# Patient Record
Sex: FEMALE | Race: WHITE | NOT HISPANIC OR LATINO | Employment: OTHER | URBAN - METROPOLITAN AREA
[De-identification: names, ages, dates, MRNs, and addresses within clinical notes are randomized per-mention and may not be internally consistent; named-entity substitution may affect disease eponyms.]

---

## 2023-04-10 ENCOUNTER — HOSPITAL ENCOUNTER (OUTPATIENT)
Facility: HOSPITAL | Age: 63
LOS: 1 days | Discharge: HOME | End: 2023-04-11
Attending: EMERGENCY MEDICINE | Admitting: INTERNAL MEDICINE
Payer: MEDICARE

## 2023-04-10 ENCOUNTER — APPOINTMENT (OUTPATIENT)
Dept: GENERAL RADIOLOGY | Facility: HOSPITAL | Age: 63
End: 2023-04-10
Payer: MEDICARE

## 2023-04-10 ENCOUNTER — APPOINTMENT (OUTPATIENT)
Dept: CT IMAGING | Facility: HOSPITAL | Age: 63
End: 2023-04-10
Payer: MEDICARE

## 2023-04-10 DIAGNOSIS — N39.0 RECURRENT UTI: ICD-10-CM

## 2023-04-10 DIAGNOSIS — N39.0 ACUTE UTI: Primary | ICD-10-CM

## 2023-04-10 DIAGNOSIS — G89.4 CHRONIC PAIN SYNDROME: ICD-10-CM

## 2023-04-10 DIAGNOSIS — C76.0 HEAD AND NECK CANCER: ICD-10-CM

## 2023-04-10 DIAGNOSIS — R41.0 CONFUSION: ICD-10-CM

## 2023-04-10 PROBLEM — A41.9 SEPSIS SECONDARY TO UTI: Status: ACTIVE | Noted: 2023-04-10

## 2023-04-10 PROBLEM — G89.29 CHRONIC PAIN: Status: ACTIVE | Noted: 2023-04-10

## 2023-04-10 PROBLEM — R41.82 ALTERED MENTAL STATUS: Status: ACTIVE | Noted: 2023-04-10

## 2023-04-10 LAB
ALBUMIN SERPL-MCNC: 4 G/DL (ref 3.5–5.2)
ALBUMIN/GLOB SERPL: 1.2 G/DL
ALP SERPL-CCNC: 118 U/L (ref 39–117)
ALT SERPL W P-5'-P-CCNC: 8 U/L (ref 1–33)
AMMONIA BLD-SCNC: 16 UMOL/L (ref 11–51)
AMPHET+METHAMPHET UR QL: NEGATIVE
AMPHETAMINES UR QL: NEGATIVE
ANION GAP SERPL CALCULATED.3IONS-SCNC: 8 MMOL/L (ref 5–15)
APAP SERPL-MCNC: <5 MCG/ML (ref 0–30)
AST SERPL-CCNC: 19 U/L (ref 1–32)
BACTERIA UR QL AUTO: ABNORMAL /HPF
BARBITURATES UR QL SCN: NEGATIVE
BASOPHILS # BLD AUTO: 0.05 10*3/MM3 (ref 0–0.2)
BASOPHILS NFR BLD AUTO: 0.5 % (ref 0–1.5)
BENZODIAZ UR QL SCN: NEGATIVE
BILIRUB SERPL-MCNC: 0.6 MG/DL (ref 0–1.2)
BILIRUB UR QL STRIP: NEGATIVE
BUN SERPL-MCNC: 5 MG/DL (ref 8–23)
BUN/CREAT SERPL: 10.2 (ref 7–25)
BUPRENORPHINE SERPL-MCNC: POSITIVE NG/ML
CALCIUM SPEC-SCNC: 9 MG/DL (ref 8.6–10.5)
CANNABINOIDS SERPL QL: NEGATIVE
CHLORIDE SERPL-SCNC: 98 MMOL/L (ref 98–107)
CLARITY UR: ABNORMAL
CO2 SERPL-SCNC: 29 MMOL/L (ref 22–29)
COCAINE UR QL: NEGATIVE
COLOR UR: YELLOW
CREAT SERPL-MCNC: 0.49 MG/DL (ref 0.57–1)
D-LACTATE SERPL-SCNC: 0.8 MMOL/L (ref 0.5–2)
DEPRECATED RDW RBC AUTO: 44.5 FL (ref 37–54)
EGFRCR SERPLBLD CKD-EPI 2021: 107.4 ML/MIN/1.73
EOSINOPHIL # BLD AUTO: 0.05 10*3/MM3 (ref 0–0.4)
EOSINOPHIL NFR BLD AUTO: 0.5 % (ref 0.3–6.2)
ERYTHROCYTE [DISTWIDTH] IN BLOOD BY AUTOMATED COUNT: 12.8 % (ref 12.3–15.4)
ETHANOL BLD-MCNC: <10 MG/DL (ref 0–10)
GLOBULIN UR ELPH-MCNC: 3.3 GM/DL
GLUCOSE SERPL-MCNC: 105 MG/DL (ref 65–99)
GLUCOSE UR STRIP-MCNC: NEGATIVE MG/DL
HCT VFR BLD AUTO: 42.9 % (ref 34–46.6)
HGB BLD-MCNC: 13.6 G/DL (ref 12–15.9)
HGB UR QL STRIP.AUTO: ABNORMAL
HOLD SPECIMEN: NORMAL
HYALINE CASTS UR QL AUTO: ABNORMAL /LPF
IMM GRANULOCYTES # BLD AUTO: 0.04 10*3/MM3 (ref 0–0.05)
IMM GRANULOCYTES NFR BLD AUTO: 0.4 % (ref 0–0.5)
KETONES UR QL STRIP: NEGATIVE
LEUKOCYTE ESTERASE UR QL STRIP.AUTO: ABNORMAL
LYMPHOCYTES # BLD AUTO: 1.46 10*3/MM3 (ref 0.7–3.1)
LYMPHOCYTES NFR BLD AUTO: 13.5 % (ref 19.6–45.3)
MAGNESIUM SERPL-MCNC: 2 MG/DL (ref 1.6–2.4)
MCH RBC QN AUTO: 29.8 PG (ref 26.6–33)
MCHC RBC AUTO-ENTMCNC: 31.7 G/DL (ref 31.5–35.7)
MCV RBC AUTO: 93.9 FL (ref 79–97)
METHADONE UR QL SCN: NEGATIVE
MONOCYTES # BLD AUTO: 0.91 10*3/MM3 (ref 0.1–0.9)
MONOCYTES NFR BLD AUTO: 8.4 % (ref 5–12)
NEUTROPHILS NFR BLD AUTO: 76.7 % (ref 42.7–76)
NEUTROPHILS NFR BLD AUTO: 8.3 10*3/MM3 (ref 1.7–7)
NITRITE UR QL STRIP: POSITIVE
NRBC BLD AUTO-RTO: 0 /100 WBC (ref 0–0.2)
OPIATES UR QL: NEGATIVE
OXYCODONE UR QL SCN: NEGATIVE
PCP UR QL SCN: NEGATIVE
PH UR STRIP.AUTO: 5.5 [PH] (ref 5–8)
PLATELET # BLD AUTO: 250 10*3/MM3 (ref 140–450)
PMV BLD AUTO: 9 FL (ref 6–12)
POTASSIUM SERPL-SCNC: 3.9 MMOL/L (ref 3.5–5.2)
PROCALCITONIN SERPL-MCNC: 0.05 NG/ML (ref 0–0.25)
PROPOXYPH UR QL: NEGATIVE
PROT SERPL-MCNC: 7.3 G/DL (ref 6–8.5)
PROT UR QL STRIP: ABNORMAL
RBC # BLD AUTO: 4.57 10*6/MM3 (ref 3.77–5.28)
RBC # UR STRIP: ABNORMAL /HPF
REF LAB TEST METHOD: ABNORMAL
SALICYLATES SERPL-MCNC: <0.3 MG/DL
SODIUM SERPL-SCNC: 135 MMOL/L (ref 136–145)
SP GR UR STRIP: 1.01 (ref 1–1.03)
SQUAMOUS #/AREA URNS HPF: ABNORMAL /HPF
TRICYCLICS UR QL SCN: POSITIVE
TROPONIN T SERPL HS-MCNC: 9 NG/L
TSH SERPL DL<=0.05 MIU/L-ACNC: 0.83 UIU/ML (ref 0.27–4.2)
UROBILINOGEN UR QL STRIP: ABNORMAL
WBC # UR STRIP: ABNORMAL /HPF
WBC NRBC COR # BLD: 10.81 10*3/MM3 (ref 3.4–10.8)
WHOLE BLOOD HOLD COAG: NORMAL
WHOLE BLOOD HOLD SPECIMEN: NORMAL

## 2023-04-10 PROCEDURE — 85025 COMPLETE CBC W/AUTO DIFF WBC: CPT

## 2023-04-10 PROCEDURE — 36415 COLL VENOUS BLD VENIPUNCTURE: CPT

## 2023-04-10 PROCEDURE — 80179 DRUG ASSAY SALICYLATE: CPT | Performed by: EMERGENCY MEDICINE

## 2023-04-10 PROCEDURE — 81001 URINALYSIS AUTO W/SCOPE: CPT | Performed by: EMERGENCY MEDICINE

## 2023-04-10 PROCEDURE — 80306 DRUG TEST PRSMV INSTRMNT: CPT | Performed by: EMERGENCY MEDICINE

## 2023-04-10 PROCEDURE — 25010000002 CEFTRIAXONE PER 250 MG: Performed by: STUDENT IN AN ORGANIZED HEALTH CARE EDUCATION/TRAINING PROGRAM

## 2023-04-10 PROCEDURE — 87086 URINE CULTURE/COLONY COUNT: CPT | Performed by: EMERGENCY MEDICINE

## 2023-04-10 PROCEDURE — 84484 ASSAY OF TROPONIN QUANT: CPT

## 2023-04-10 PROCEDURE — 25010000002 ENOXAPARIN PER 10 MG: Performed by: STUDENT IN AN ORGANIZED HEALTH CARE EDUCATION/TRAINING PROGRAM

## 2023-04-10 PROCEDURE — 99284 EMERGENCY DEPT VISIT MOD MDM: CPT

## 2023-04-10 PROCEDURE — 82077 ASSAY SPEC XCP UR&BREATH IA: CPT | Performed by: EMERGENCY MEDICINE

## 2023-04-10 PROCEDURE — 71045 X-RAY EXAM CHEST 1 VIEW: CPT

## 2023-04-10 PROCEDURE — 87077 CULTURE AEROBIC IDENTIFY: CPT | Performed by: EMERGENCY MEDICINE

## 2023-04-10 PROCEDURE — 84145 PROCALCITONIN (PCT): CPT | Performed by: EMERGENCY MEDICINE

## 2023-04-10 PROCEDURE — 82140 ASSAY OF AMMONIA: CPT | Performed by: EMERGENCY MEDICINE

## 2023-04-10 PROCEDURE — 87186 SC STD MICRODIL/AGAR DIL: CPT | Performed by: EMERGENCY MEDICINE

## 2023-04-10 PROCEDURE — 83605 ASSAY OF LACTIC ACID: CPT | Performed by: EMERGENCY MEDICINE

## 2023-04-10 PROCEDURE — 83735 ASSAY OF MAGNESIUM: CPT

## 2023-04-10 PROCEDURE — 70450 CT HEAD/BRAIN W/O DYE: CPT

## 2023-04-10 PROCEDURE — 80053 COMPREHEN METABOLIC PANEL: CPT

## 2023-04-10 PROCEDURE — 87040 BLOOD CULTURE FOR BACTERIA: CPT | Performed by: STUDENT IN AN ORGANIZED HEALTH CARE EDUCATION/TRAINING PROGRAM

## 2023-04-10 PROCEDURE — 84443 ASSAY THYROID STIM HORMONE: CPT | Performed by: STUDENT IN AN ORGANIZED HEALTH CARE EDUCATION/TRAINING PROGRAM

## 2023-04-10 PROCEDURE — 80143 DRUG ASSAY ACETAMINOPHEN: CPT | Performed by: EMERGENCY MEDICINE

## 2023-04-10 PROCEDURE — 93005 ELECTROCARDIOGRAM TRACING: CPT

## 2023-04-10 PROCEDURE — 96372 THER/PROPH/DIAG INJ SC/IM: CPT

## 2023-04-10 RX ORDER — AMOXICILLIN 250 MG
2 CAPSULE ORAL 2 TIMES DAILY
Status: DISCONTINUED | OUTPATIENT
Start: 2023-04-10 | End: 2023-04-11 | Stop reason: HOSPADM

## 2023-04-10 RX ORDER — SODIUM CHLORIDE 9 MG/ML
40 INJECTION, SOLUTION INTRAVENOUS AS NEEDED
Status: DISCONTINUED | OUTPATIENT
Start: 2023-04-10 | End: 2023-04-11 | Stop reason: HOSPADM

## 2023-04-10 RX ORDER — CHOLECALCIFEROL (VITAMIN D3) 125 MCG
5 CAPSULE ORAL NIGHTLY PRN
Status: DISCONTINUED | OUTPATIENT
Start: 2023-04-10 | End: 2023-04-11 | Stop reason: HOSPADM

## 2023-04-10 RX ORDER — CYCLOBENZAPRINE HCL 10 MG
10 TABLET ORAL 2 TIMES DAILY PRN
Status: DISCONTINUED | OUTPATIENT
Start: 2023-04-10 | End: 2023-04-11 | Stop reason: HOSPADM

## 2023-04-10 RX ORDER — GABAPENTIN 800 MG/1
800 TABLET ORAL 3 TIMES DAILY
COMMUNITY

## 2023-04-10 RX ORDER — SODIUM CHLORIDE, SODIUM LACTATE, POTASSIUM CHLORIDE, CALCIUM CHLORIDE 600; 310; 30; 20 MG/100ML; MG/100ML; MG/100ML; MG/100ML
75 INJECTION, SOLUTION INTRAVENOUS CONTINUOUS
Status: ACTIVE | OUTPATIENT
Start: 2023-04-10 | End: 2023-04-11

## 2023-04-10 RX ORDER — ACETAMINOPHEN 325 MG/1
650 TABLET ORAL EVERY 4 HOURS PRN
Status: DISCONTINUED | OUTPATIENT
Start: 2023-04-10 | End: 2023-04-11 | Stop reason: HOSPADM

## 2023-04-10 RX ORDER — CYCLOBENZAPRINE HCL 10 MG
10 TABLET ORAL 2 TIMES DAILY PRN
COMMUNITY

## 2023-04-10 RX ORDER — BUPRENORPHINE AND NALOXONE 2; .5 MG/1; MG/1
0.25 FILM, SOLUBLE BUCCAL; SUBLINGUAL DAILY
COMMUNITY
End: 2023-04-19

## 2023-04-10 RX ORDER — GUAIFENESIN 600 MG/1
1200 TABLET, EXTENDED RELEASE ORAL 2 TIMES DAILY
COMMUNITY
End: 2023-04-19

## 2023-04-10 RX ORDER — BISACODYL 10 MG
10 SUPPOSITORY, RECTAL RECTAL DAILY PRN
Status: DISCONTINUED | OUTPATIENT
Start: 2023-04-10 | End: 2023-04-11 | Stop reason: HOSPADM

## 2023-04-10 RX ORDER — BUPRENORPHINE HYDROCHLORIDE AND NALOXONE HYDROCHLORIDE DIHYDRATE 8; 2 MG/1; MG/1
1 TABLET SUBLINGUAL DAILY
Status: ON HOLD | COMMUNITY
End: 2023-04-10

## 2023-04-10 RX ORDER — CYCLOBENZAPRINE HCL 10 MG
10 TABLET ORAL 3 TIMES DAILY PRN
Status: DISCONTINUED | OUTPATIENT
Start: 2023-04-10 | End: 2023-04-10

## 2023-04-10 RX ORDER — SODIUM CHLORIDE 0.9 % (FLUSH) 0.9 %
10 SYRINGE (ML) INJECTION EVERY 12 HOURS SCHEDULED
Status: DISCONTINUED | OUTPATIENT
Start: 2023-04-10 | End: 2023-04-11 | Stop reason: HOSPADM

## 2023-04-10 RX ORDER — BUPRENORPHINE HYDROCHLORIDE AND NALOXONE HYDROCHLORIDE DIHYDRATE 2; .5 MG/1; MG/1
1 TABLET SUBLINGUAL 4 TIMES DAILY PRN
Status: DISCONTINUED | OUTPATIENT
Start: 2023-04-10 | End: 2023-04-11 | Stop reason: HOSPADM

## 2023-04-10 RX ORDER — SODIUM CHLORIDE 0.9 % (FLUSH) 0.9 %
10 SYRINGE (ML) INJECTION AS NEEDED
Status: DISCONTINUED | OUTPATIENT
Start: 2023-04-10 | End: 2023-04-11 | Stop reason: HOSPADM

## 2023-04-10 RX ORDER — TRAMADOL HYDROCHLORIDE 50 MG/1
50 TABLET ORAL 2 TIMES DAILY
Status: DISCONTINUED | OUTPATIENT
Start: 2023-04-10 | End: 2023-04-10

## 2023-04-10 RX ORDER — ENOXAPARIN SODIUM 100 MG/ML
40 INJECTION SUBCUTANEOUS NIGHTLY
Status: DISCONTINUED | OUTPATIENT
Start: 2023-04-10 | End: 2023-04-11 | Stop reason: HOSPADM

## 2023-04-10 RX ORDER — POLYETHYLENE GLYCOL 3350 17 G/17G
17 POWDER, FOR SOLUTION ORAL DAILY PRN
Status: DISCONTINUED | OUTPATIENT
Start: 2023-04-10 | End: 2023-04-11 | Stop reason: HOSPADM

## 2023-04-10 RX ORDER — ONDANSETRON 2 MG/ML
4 INJECTION INTRAMUSCULAR; INTRAVENOUS EVERY 6 HOURS PRN
Status: DISCONTINUED | OUTPATIENT
Start: 2023-04-10 | End: 2023-04-11 | Stop reason: HOSPADM

## 2023-04-10 RX ORDER — GABAPENTIN 400 MG/1
800 CAPSULE ORAL EVERY 8 HOURS SCHEDULED
Status: DISCONTINUED | OUTPATIENT
Start: 2023-04-10 | End: 2023-04-11 | Stop reason: HOSPADM

## 2023-04-10 RX ORDER — TRAMADOL HYDROCHLORIDE 50 MG/1
50 TABLET ORAL EVERY 12 HOURS PRN
Status: DISCONTINUED | OUTPATIENT
Start: 2023-04-10 | End: 2023-04-11 | Stop reason: HOSPADM

## 2023-04-10 RX ORDER — BISACODYL 5 MG/1
5 TABLET, DELAYED RELEASE ORAL DAILY PRN
Status: DISCONTINUED | OUTPATIENT
Start: 2023-04-10 | End: 2023-04-11 | Stop reason: HOSPADM

## 2023-04-10 RX ORDER — TRAMADOL HYDROCHLORIDE 50 MG/1
50 TABLET ORAL 2 TIMES DAILY
COMMUNITY

## 2023-04-10 RX ORDER — SODIUM CHLORIDE 0.9 % (FLUSH) 0.9 %
10 SYRINGE (ML) INJECTION AS NEEDED
Status: DISCONTINUED | OUTPATIENT
Start: 2023-04-10 | End: 2023-04-10 | Stop reason: SDUPTHER

## 2023-04-10 RX ADMIN — SODIUM CHLORIDE 1 G: 900 INJECTION INTRAVENOUS at 22:21

## 2023-04-10 RX ADMIN — Medication 10 ML: at 22:48

## 2023-04-10 RX ADMIN — SODIUM CHLORIDE, POTASSIUM CHLORIDE, SODIUM LACTATE AND CALCIUM CHLORIDE 75 ML/HR: 600; 310; 30; 20 INJECTION, SOLUTION INTRAVENOUS at 23:32

## 2023-04-10 RX ADMIN — ENOXAPARIN SODIUM 40 MG: 40 INJECTION SUBCUTANEOUS at 22:20

## 2023-04-10 RX ADMIN — SENNOSIDES AND DOCUSATE SODIUM 2 TABLET: 50; 8.6 TABLET ORAL at 22:20

## 2023-04-10 RX ADMIN — GABAPENTIN 800 MG: 400 CAPSULE ORAL at 22:40

## 2023-04-10 RX ADMIN — Medication: at 23:36

## 2023-04-10 RX ADMIN — ACETAMINOPHEN 325MG 650 MG: 325 TABLET ORAL at 23:32

## 2023-04-10 RX ADMIN — BUPRENORPHINE AND NALOXONE 1 TABLET: 2; .5 TABLET SUBLINGUAL at 23:54

## 2023-04-10 NOTE — Clinical Note
Level of Care: Telemetry [5]   Diagnosis: Sepsis secondary to UTI [519862]   Admitting Physician: ANDREINA ALEX [[id number]]

## 2023-04-11 ENCOUNTER — READMISSION MANAGEMENT (OUTPATIENT)
Dept: CALL CENTER | Facility: HOSPITAL | Age: 63
End: 2023-04-11
Payer: MEDICARE

## 2023-04-11 VITALS
HEART RATE: 87 BPM | SYSTOLIC BLOOD PRESSURE: 102 MMHG | TEMPERATURE: 98.1 F | WEIGHT: 122 LBS | DIASTOLIC BLOOD PRESSURE: 62 MMHG | RESPIRATION RATE: 18 BRPM | BODY MASS INDEX: 20.83 KG/M2 | HEIGHT: 64 IN | OXYGEN SATURATION: 95 %

## 2023-04-11 PROBLEM — R41.82 ALTERED MENTAL STATUS: Status: RESOLVED | Noted: 2023-04-10 | Resolved: 2023-04-11

## 2023-04-11 PROBLEM — N39.0 SEPSIS SECONDARY TO UTI: Status: RESOLVED | Noted: 2023-04-10 | Resolved: 2023-04-11

## 2023-04-11 PROBLEM — N39.0 ACUTE UTI: Status: RESOLVED | Noted: 2023-04-11 | Resolved: 2023-04-11

## 2023-04-11 PROBLEM — N39.0 ACUTE UTI: Status: ACTIVE | Noted: 2023-04-11

## 2023-04-11 PROBLEM — A41.9 SEPSIS SECONDARY TO UTI: Status: RESOLVED | Noted: 2023-04-10 | Resolved: 2023-04-11

## 2023-04-11 LAB
ANION GAP SERPL CALCULATED.3IONS-SCNC: 6 MMOL/L (ref 5–15)
BASOPHILS # BLD AUTO: 0.05 10*3/MM3 (ref 0–0.2)
BASOPHILS NFR BLD AUTO: 0.5 % (ref 0–1.5)
BUN SERPL-MCNC: 6 MG/DL (ref 8–23)
BUN/CREAT SERPL: 12 (ref 7–25)
CALCIUM SPEC-SCNC: 8.8 MG/DL (ref 8.6–10.5)
CHLORIDE SERPL-SCNC: 98 MMOL/L (ref 98–107)
CO2 SERPL-SCNC: 28 MMOL/L (ref 22–29)
CREAT SERPL-MCNC: 0.5 MG/DL (ref 0.57–1)
DEPRECATED RDW RBC AUTO: 43.9 FL (ref 37–54)
EGFRCR SERPLBLD CKD-EPI 2021: 106.9 ML/MIN/1.73
EOSINOPHIL # BLD AUTO: 0.09 10*3/MM3 (ref 0–0.4)
EOSINOPHIL NFR BLD AUTO: 0.9 % (ref 0.3–6.2)
ERYTHROCYTE [DISTWIDTH] IN BLOOD BY AUTOMATED COUNT: 12.7 % (ref 12.3–15.4)
GLUCOSE BLDC GLUCOMTR-MCNC: 107 MG/DL (ref 70–130)
GLUCOSE BLDC GLUCOMTR-MCNC: 125 MG/DL (ref 70–130)
GLUCOSE SERPL-MCNC: 114 MG/DL (ref 65–99)
HCT VFR BLD AUTO: 35.1 % (ref 34–46.6)
HGB BLD-MCNC: 11.5 G/DL (ref 12–15.9)
IMM GRANULOCYTES # BLD AUTO: 0.04 10*3/MM3 (ref 0–0.05)
IMM GRANULOCYTES NFR BLD AUTO: 0.4 % (ref 0–0.5)
LYMPHOCYTES # BLD AUTO: 1.6 10*3/MM3 (ref 0.7–3.1)
LYMPHOCYTES NFR BLD AUTO: 15.7 % (ref 19.6–45.3)
MCH RBC QN AUTO: 30.6 PG (ref 26.6–33)
MCHC RBC AUTO-ENTMCNC: 32.8 G/DL (ref 31.5–35.7)
MCV RBC AUTO: 93.4 FL (ref 79–97)
MONOCYTES # BLD AUTO: 1.09 10*3/MM3 (ref 0.1–0.9)
MONOCYTES NFR BLD AUTO: 10.7 % (ref 5–12)
NEUTROPHILS NFR BLD AUTO: 7.34 10*3/MM3 (ref 1.7–7)
NEUTROPHILS NFR BLD AUTO: 71.8 % (ref 42.7–76)
NRBC BLD AUTO-RTO: 0 /100 WBC (ref 0–0.2)
PLATELET # BLD AUTO: 216 10*3/MM3 (ref 140–450)
PMV BLD AUTO: 9.1 FL (ref 6–12)
POTASSIUM SERPL-SCNC: 3.6 MMOL/L (ref 3.5–5.2)
RBC # BLD AUTO: 3.76 10*6/MM3 (ref 3.77–5.28)
SODIUM SERPL-SCNC: 132 MMOL/L (ref 136–145)
WBC NRBC COR # BLD: 10.21 10*3/MM3 (ref 3.4–10.8)

## 2023-04-11 PROCEDURE — G0378 HOSPITAL OBSERVATION PER HR: HCPCS

## 2023-04-11 PROCEDURE — 80048 BASIC METABOLIC PNL TOTAL CA: CPT | Performed by: STUDENT IN AN ORGANIZED HEALTH CARE EDUCATION/TRAINING PROGRAM

## 2023-04-11 PROCEDURE — 63710000001 POTASSIUM CHLORIDE 10 MEQ CAPSULE CONTROLLED-RELEASE: Performed by: INTERNAL MEDICINE

## 2023-04-11 PROCEDURE — A9270 NON-COVERED ITEM OR SERVICE: HCPCS | Performed by: STUDENT IN AN ORGANIZED HEALTH CARE EDUCATION/TRAINING PROGRAM

## 2023-04-11 PROCEDURE — 63710000001 SENNOSIDES-DOCUSATE 8.6-50 MG TABLET: Performed by: STUDENT IN AN ORGANIZED HEALTH CARE EDUCATION/TRAINING PROGRAM

## 2023-04-11 PROCEDURE — 92610 EVALUATE SWALLOWING FUNCTION: CPT

## 2023-04-11 PROCEDURE — A9270 NON-COVERED ITEM OR SERVICE: HCPCS | Performed by: INTERNAL MEDICINE

## 2023-04-11 PROCEDURE — 63710000001: Performed by: STUDENT IN AN ORGANIZED HEALTH CARE EDUCATION/TRAINING PROGRAM

## 2023-04-11 PROCEDURE — 85025 COMPLETE CBC W/AUTO DIFF WBC: CPT | Performed by: STUDENT IN AN ORGANIZED HEALTH CARE EDUCATION/TRAINING PROGRAM

## 2023-04-11 PROCEDURE — 82962 GLUCOSE BLOOD TEST: CPT

## 2023-04-11 RX ORDER — POTASSIUM CHLORIDE 750 MG/1
40 CAPSULE, EXTENDED RELEASE ORAL EVERY 4 HOURS
Status: DISCONTINUED | OUTPATIENT
Start: 2023-04-11 | End: 2023-04-11 | Stop reason: HOSPADM

## 2023-04-11 RX ORDER — CEFUROXIME AXETIL 250 MG/1
250 TABLET ORAL 2 TIMES DAILY
Qty: 6 TABLET | Refills: 0 | Status: SHIPPED | OUTPATIENT
Start: 2023-04-11 | End: 2023-04-14

## 2023-04-11 RX ADMIN — GABAPENTIN 800 MG: 400 CAPSULE ORAL at 06:15

## 2023-04-11 RX ADMIN — POTASSIUM CHLORIDE 40 MEQ: 10 CAPSULE, COATED, EXTENDED RELEASE ORAL at 08:57

## 2023-04-11 RX ADMIN — SENNOSIDES AND DOCUSATE SODIUM 2 TABLET: 50; 8.6 TABLET ORAL at 08:57

## 2023-04-11 RX ADMIN — BUPRENORPHINE AND NALOXONE 1 TABLET: 2; .5 TABLET SUBLINGUAL at 09:05

## 2023-04-11 RX ADMIN — Medication 10 ML: at 08:58

## 2023-04-11 NOTE — ED PROVIDER NOTES
"Subjective   History of Present Illness  63-year-old female presents for evaluation of altered mental status.  Of note, the patient is accompanied by her sister as she is a somewhat poor historian.  She was reportedly recently admitted to the hospital in [place] for sepsis secondary to UTI and pneumonia.  She just recently relocated here to [place] and is living with her sister.  Her sister notes that she has had increased confusion when compared to baseline over the past few days and seems to be \"talking out of her head.\"  Her sister was concerned about a recurrent UTI and brought her to the ED to be evaluated.  No known head trauma.  No known fevers.  No known exposures to anyone with COVID-19.  The patient denies any illicit drug use.  No known falls.        Review of Systems   Unable to perform ROS: Mental status change   Psychiatric/Behavioral: Positive for confusion.       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        Allergies   Allergen Reactions   • Sulfa Antibiotics Hives       Past Surgical History:   Procedure Laterality Date   • FACIAL PROCEDURE     • HYSTERECTOMY     • KNEE SURGERY Bilateral        Family History   Problem Relation Age of Onset   • Coronary artery disease Mother    • Alzheimer's disease Father        Social History     Socioeconomic History   • Marital status: Single   Tobacco Use   • Smoking status: Some Days     Packs/day: 0.50     Years: 40.00     Pack years: 20.00     Types: Cigarettes   • Smokeless tobacco: Never   Vaping Use   • Vaping Use: Some days   • Substances: Nicotine   • Devices: Disposable   • Passive vaping exposure: Yes   Substance and Sexual Activity   • Alcohol use: Not Currently   • Drug use: Never   • Sexual activity: Defer "           Objective   Physical Exam  Vitals and nursing note reviewed.   Constitutional:       Appearance: She is well-developed. She is not diaphoretic.      Comments: Nontoxic-appearing female, appears mildly confused   HENT:      Head: Normocephalic and atraumatic.   Eyes:      Pupils: Pupils are equal, round, and reactive to light.   Neck:      Comments: No meningeal signs or nuchal rigidity  Cardiovascular:      Rate and Rhythm: Normal rate and regular rhythm.      Heart sounds: Normal heart sounds. No murmur heard.    No friction rub. No gallop.   Pulmonary:      Effort: Pulmonary effort is normal. No respiratory distress.      Breath sounds: Normal breath sounds. No wheezing or rales.   Abdominal:      General: Bowel sounds are normal. There is no distension.      Palpations: Abdomen is soft. There is no mass.      Tenderness: There is no abdominal tenderness. There is no guarding or rebound.      Comments: No focal abdominal tenderness, no peritoneal signs, no pain out of proportion to exam    No CVA tenderness noted   Musculoskeletal:         General: Normal range of motion.      Cervical back: Neck supple.   Skin:     General: Skin is warm and dry.      Findings: No erythema or rash.      Comments: No dermatomal rash present   Neurological:      Mental Status: She is alert and oriented to person, place, and time.      Comments: Alert to person, place, and year, appears mildly confused, 5 out of 5 strength in all fours, neurovascularly intact distally in all fours with bounding distal pulses and normal sensation, following simple commands   Psychiatric:         Mood and Affect: Mood normal.         Thought Content: Thought content normal.         Judgment: Judgment normal.         Procedures           ED Course  ED Course as of 04/11/23 0223   Mon Apr 10, 2023   1934 63-year-old female presents for evaluation of altered mental status.  Of note, the patient is accompanied by her sister as she is a somewhat  "poor historian.  She was reportedly recently admitted to the hospital in [place] for sepsis secondary to UTI and pneumonia.  She just recently relocated here to [place] and is living with her sister.  Her sister notes that she has had increased confusion when compared to baseline over the past few days and seems to be \"talking out of her head.\"  She was concerned about a recurrent UTI and brought her to the ED to be evaluated.  On arrival, the patient is nontoxic-appearing.  Nonsurgical abdomen.  She does appear somewhat confused.  Urinalysis is suggestive of gross infection.  Blood and urine cultures obtained.  Cefepime given.  We will seek admission to the hospital. [DD]   1937 I personally and independently viewed the patient's x-ray images myself, and I am in agreement with the radiologist's reading for final interpretation.   [DD]   1937 I discussed the patient's case with Dr. Quiroga, the patient will be admitted under her care for further evaluation and treatment.  The patient is aware/agreeable with the plan at this time. [DD]      ED Course User Index  [DD] Jonathan Morataya MD                                          Recent Results (from the past 24 hour(s))   ECG 12 Lead ED Triage Standing Order; Weak / Dizzy / AMS    Collection Time: 04/10/23  5:43 PM   Result Value Ref Range    QT Interval 346 ms    QTC Interval 453 ms   Comprehensive Metabolic Panel    Collection Time: 04/10/23  6:30 PM    Specimen: Blood   Result Value Ref Range    Glucose 105 (H) 65 - 99 mg/dL    BUN 5 (L) 8 - 23 mg/dL    Creatinine 0.49 (L) 0.57 - 1.00 mg/dL    Sodium 135 (L) 136 - 145 mmol/L    Potassium 3.9 3.5 - 5.2 mmol/L    Chloride 98 98 - 107 mmol/L    CO2 29.0 22.0 - 29.0 mmol/L    Calcium 9.0 8.6 - 10.5 mg/dL    Total Protein 7.3 6.0 - 8.5 g/dL    Albumin 4.0 3.5 - 5.2 g/dL    ALT (SGPT) 8 1 - 33 U/L    AST (SGOT) 19 1 - 32 U/L    Alkaline Phosphatase 118 (H) 39 - 117 U/L    Total Bilirubin 0.6 0.0 - 1.2 mg/dL    " Globulin 3.3 gm/dL    A/G Ratio 1.2 g/dL    BUN/Creatinine Ratio 10.2 7.0 - 25.0    Anion Gap 8.0 5.0 - 15.0 mmol/L    eGFR 107.4 >60.0 mL/min/1.73   Single High Sensitivity Troponin T    Collection Time: 04/10/23  6:30 PM    Specimen: Blood   Result Value Ref Range    HS Troponin T 9 <10 ng/L   Magnesium    Collection Time: 04/10/23  6:30 PM    Specimen: Blood   Result Value Ref Range    Magnesium 2.0 1.6 - 2.4 mg/dL   Green Top (Gel)    Collection Time: 04/10/23  6:30 PM   Result Value Ref Range    Extra Tube Hold for add-ons.    Lavender Top    Collection Time: 04/10/23  6:30 PM   Result Value Ref Range    Extra Tube hold for add-on    Gold Top - SST    Collection Time: 04/10/23  6:30 PM   Result Value Ref Range    Extra Tube Hold for add-ons.    Gray Top    Collection Time: 04/10/23  6:30 PM   Result Value Ref Range    Extra Tube Hold for add-ons.    Light Blue Top    Collection Time: 04/10/23  6:30 PM   Result Value Ref Range    Extra Tube Hold for add-ons.    CBC Auto Differential    Collection Time: 04/10/23  6:30 PM    Specimen: Blood   Result Value Ref Range    WBC 10.81 (H) 3.40 - 10.80 10*3/mm3    RBC 4.57 3.77 - 5.28 10*6/mm3    Hemoglobin 13.6 12.0 - 15.9 g/dL    Hematocrit 42.9 34.0 - 46.6 %    MCV 93.9 79.0 - 97.0 fL    MCH 29.8 26.6 - 33.0 pg    MCHC 31.7 31.5 - 35.7 g/dL    RDW 12.8 12.3 - 15.4 %    RDW-SD 44.5 37.0 - 54.0 fl    MPV 9.0 6.0 - 12.0 fL    Platelets 250 140 - 450 10*3/mm3    Neutrophil % 76.7 (H) 42.7 - 76.0 %    Lymphocyte % 13.5 (L) 19.6 - 45.3 %    Monocyte % 8.4 5.0 - 12.0 %    Eosinophil % 0.5 0.3 - 6.2 %    Basophil % 0.5 0.0 - 1.5 %    Immature Grans % 0.4 0.0 - 0.5 %    Neutrophils, Absolute 8.30 (H) 1.70 - 7.00 10*3/mm3    Lymphocytes, Absolute 1.46 0.70 - 3.10 10*3/mm3    Monocytes, Absolute 0.91 (H) 0.10 - 0.90 10*3/mm3    Eosinophils, Absolute 0.05 0.00 - 0.40 10*3/mm3    Basophils, Absolute 0.05 0.00 - 0.20 10*3/mm3    Immature Grans, Absolute 0.04 0.00 - 0.05 10*3/mm3     nRBC 0.0 0.0 - 0.2 /100 WBC   Ammonia    Collection Time: 04/10/23  6:30 PM    Specimen: Blood   Result Value Ref Range    Ammonia 16 11 - 51 umol/L   Acetaminophen Level    Collection Time: 04/10/23  6:30 PM    Specimen: Blood   Result Value Ref Range    Acetaminophen <5.0 0.0 - 30.0 mcg/mL   Ethanol    Collection Time: 04/10/23  6:30 PM    Specimen: Blood   Result Value Ref Range    Ethanol <10 0 - 10 mg/dL   Salicylate Level    Collection Time: 04/10/23  6:30 PM    Specimen: Blood   Result Value Ref Range    Salicylate <0.3 <=30.0 mg/dL   Lactic Acid, Plasma    Collection Time: 04/10/23  6:30 PM    Specimen: Blood   Result Value Ref Range    Lactate 0.8 0.5 - 2.0 mmol/L   Procalcitonin    Collection Time: 04/10/23  6:30 PM    Specimen: Blood   Result Value Ref Range    Procalcitonin 0.05 0.00 - 0.25 ng/mL   TSH    Collection Time: 04/10/23  6:30 PM    Specimen: Blood   Result Value Ref Range    TSH 0.828 0.270 - 4.200 uIU/mL   Urinalysis With Culture If Indicated - Urine, Clean Catch    Collection Time: 04/10/23  6:34 PM    Specimen: Urine, Clean Catch   Result Value Ref Range    Color, UA Yellow Yellow, Straw    Appearance, UA Turbid (A) Clear    pH, UA 5.5 5.0 - 8.0    Specific Gravity, UA 1.011 1.001 - 1.030    Glucose, UA Negative Negative    Ketones, UA Negative Negative    Bilirubin, UA Negative Negative    Blood, UA Moderate (2+) (A) Negative    Protein, UA 30 mg/dL (1+) (A) Negative    Leuk Esterase, UA Large (3+) (A) Negative    Nitrite, UA Positive (A) Negative    Urobilinogen, UA 0.2 E.U./dL 0.2 - 1.0 E.U./dL   Urine Drug Screen - Urine, Clean Catch    Collection Time: 04/10/23  6:34 PM    Specimen: Urine, Clean Catch   Result Value Ref Range    THC, Screen, Urine Negative Negative    Phencyclidine (PCP), Urine Negative Negative    Cocaine Screen, Urine Negative Negative    Methamphetamine, Ur Negative Negative    Opiate Screen Negative Negative    Amphetamine Screen, Urine Negative Negative     "Benzodiazepine Screen, Urine Negative Negative    Tricyclic Antidepressants Screen Positive (A) Negative    Methadone Screen, Urine Negative Negative    Barbiturates Screen, Urine Negative Negative    Oxycodone Screen, Urine Negative Negative    Propoxyphene Screen Negative Negative    Buprenorphine, Screen, Urine Positive (A) Negative   Urinalysis, Microscopic Only - Urine, Clean Catch    Collection Time: 04/10/23  6:34 PM    Specimen: Urine, Clean Catch   Result Value Ref Range    RBC, UA 7-12 (A) None Seen, 0-2 /HPF    WBC, UA Too Numerous to Count (A) None Seen, 0-2 /HPF    Bacteria, UA 4+ (A) None Seen, Trace /HPF    Squamous Epithelial Cells, UA 3-6 (A) None Seen, 0-2 /HPF    Hyaline Casts, UA None Seen 0 - 6 /LPF    Methodology Manual Light Microscopy      Note: In addition to lab results from this visit, the labs listed above may include labs taken at another facility or during a different encounter within the last 24 hours. Please correlate lab times with ED admission and discharge times for further clarification of the services performed during this visit.    CT Head Without Contrast   Final Result   Impression:   No acute intracranial abnormality.      Electronically Signed: Angelo Seals     4/10/2023 8:39 PM EDT     Workstation ID: [id number]      XR Chest 1 View   Final Result   No evidence of acute disease in the chest.       Electronically Signed: Stanford Rosenberg     4/10/2023 6:18 PM EDT     Workstation ID: [id number]        Vitals:    04/10/23 1738 04/10/23 2111 04/10/23 2300   BP: 122/58 122/66 112/63   BP Location: Left arm Right arm Right arm   Patient Position: Sitting Sitting Sitting   Pulse: 111 105 96   Resp: 18 22 18   Temp: 98.6 °F (37 °C) 98.9 °F (37.2 °C) 100.3 °F (37.9 °C)   TempSrc: Oral Oral Oral   SpO2: 96% 91% 100%   Weight: 52.2 kg (115 lb)     Height: 162.6 cm (64\")       Medications   sodium chloride 0.9 % flush 10 mL (10 mL Intravenous Given 4/10/23 7016)   sodium chloride 0.9 % flush " 10 mL (has no administration in time range)   sodium chloride 0.9 % infusion 40 mL (has no administration in time range)   Enoxaparin Sodium (LOVENOX) syringe 40 mg (40 mg Subcutaneous Given 4/10/23 2220)   acetaminophen (TYLENOL) tablet 650 mg (650 mg Oral Given 4/10/23 2332)   Potassium Replacement - Follow Nurse / BPA Driven Protocol (has no administration in time range)   Magnesium Standard Dose Replacement - Follow Nurse / BPA Driven Protocol (has no administration in time range)   Phosphorus Replacement - Follow Nurse / BPA Driven Protocol (has no administration in time range)   Calcium Replacement - Follow Nurse / BPA Driven Protocol (has no administration in time range)   melatonin tablet 5 mg (has no administration in time range)   sennosides-docusate (PERICOLACE) 8.6-50 MG per tablet 2 tablet (2 tablets Oral Given 4/10/23 2220)     And   polyethylene glycol (MIRALAX) packet 17 g (has no administration in time range)     And   bisacodyl (DULCOLAX) EC tablet 5 mg (has no administration in time range)     And   bisacodyl (DULCOLAX) suppository 10 mg (has no administration in time range)   ondansetron (ZOFRAN) injection 4 mg (has no administration in time range)   cefTRIAXone (ROCEPHIN) 1 g/100 mL 0.9% NS (MBP) (1 g Intravenous New Bag 4/10/23 2221)   gabapentin (NEURONTIN) capsule 800 mg (800 mg Oral Given 4/10/23 2240)   buprenorphine-naloxone (SUBOXONE) 2-0.5 MG per SL tablet 1 tablet (1 tablet Sublingual Given 4/10/23 2354)   traMADol (ULTRAM) tablet 50 mg (has no administration in time range)   cyclobenzaprine (FLEXERIL) tablet 10 mg (has no administration in time range)   lactated ringers infusion (75 mL/hr Intravenous New Bag 4/10/23 2332)   !Patient Home Medications Stored in Pharmacy ( Does not apply Given 4/10/23 2336)     ECG/EMG Results (last 24 hours)     Procedure Component Value Units Date/Time    ECG 12 Lead ED Triage Standing Order; Weak / Dizzy / AMS [[id number]] Collected: 04/10/23 4477      Updated: 04/10/23 1744     QT Interval 346 ms      QTC Interval 453 ms     Narrative:      Test Reason : ED Triage Standing Order~  Blood Pressure :   */*   mmHG  Vent. Rate : 103 BPM     Atrial Rate : 103 BPM     P-R Int : 142 ms          QRS Dur :  78 ms      QT Int : 346 ms       P-R-T Axes :  88  79  40 degrees     QTc Int : 453 ms    Sinus tachycardia  Otherwise normal ECG  No previous ECGs available    Referred By: EDMD           Confirmed By:         ECG 12 Lead ED Triage Standing Order; Weak / Dizzy / AMS   Preliminary Result   Test Reason : ED Triage Standing Order~   Blood Pressure :   */*   mmHG   Vent. Rate : 103 BPM     Atrial Rate : 103 BPM      P-R Int : 142 ms          QRS Dur :  78 ms       QT Int : 346 ms       P-R-T Axes :  88  79  40 degrees      QTc Int : 453 ms      Sinus tachycardia   Otherwise normal ECG   No previous ECGs available      Referred By: TRUE           Confirmed By:               MDM    Final diagnoses:   Acute UTI   Confusion       ED Disposition  ED Disposition     ED Disposition   Decision to Admit    Condition   --    Comment   Level of Care: Telemetry [5]   Diagnosis: Sepsis secondary to UTI [462545]   Admitting Physician: ANDREINA ALEX [[id number]]   Certification: I Certify That Inpatient Hospital Services Are Medically Necessary For Greater Than 2 Midnights               No follow-up provider specified.       Medication List      ASK your doctor about these medications    Suboxone 2-0.5 MG film film  Generic drug: buprenorphine-naloxone  Ask about: Which instructions should I use?             Jonathan Morataya MD  04/11/23 0226

## 2023-04-11 NOTE — DISCHARGE SUMMARY
[facility]  DISCHARGE SUMMARY    Patient Name: June Rodriguez  : 1960  MRN: [id number]    Date of Admission: 4/10/2023  8:43 PM  Date of Discharge:  2023  Primary Care Physician: Provider, No Known    Consults     No orders found for last 30 day(s).          Hospital Course     Presenting Problem:   Sepsis secondary to UTI [A41.9, N39.0]  Acute UTI [N39.0]    Active Hospital Problems    Diagnosis  POA   • Chronic pain [G89.29]  Yes      Resolved Hospital Problems    Diagnosis Date Resolved POA   • **Sepsis secondary to UTI [A41.9, N39.0] 2023 Yes   • Acute UTI [N39.0] 2023 Yes   • Altered mental status [R41.82] 2023 Yes          Hospital Course:  June Rodriguez is a 63 y.o. female tobacco use disorder, abnormal weight loss, gallstones, chronic back pain, DDD, frequent UTIs, scoliosis, prior squamous cell carcinoma of the head/neck status post surgical resection, who presented for evaluation of altered mental status and UTI symptoms.       Sepsis secondary to UTI  Frequent UTIs  -Blood cultures exhibited no growth at time of d/c. Urine culture w/ GNR. My partner reviewed outside hospital discharge summary and appeared to have pan-susceptible E. coli UTI last month. Received Rocephin in hospital, will continue Ceftin x 3 days at d/c.    **Based on her PMH and her requests have placed referrals to Urology, ENT, pain management since she is newly here from [place].    Discharge Follow Up Recommendations for outpatient labs/diagnostics:   PCP 1 week    Day of Discharge     HPI: Up in bed. Feels better. Thinking clearly. Wants to eat breakfast.    Review of Systems  Gen- No fevers, chills  CV- No chest pain, palpitations  Resp- No cough, dyspnea  GI- No N/V/D, abd pain    Vital Signs:   Temp:  [98 °F (36.7 °C)-100.3 °F (37.9 °C)] 98.1 °F (36.7 °C)  Heart Rate:  [] 87  Resp:  [16-22] 18  BP: ()/(52-66) 102/62      Physical  Exam:  Constitutional: No acute distress, awake, alert, thin, chronically ill appearing  HENT: NCAT, mucous membranes moist  Respiratory: Clear to auscultation bilaterally, respiratory effort normal   Cardiovascular: RRR, no murmurs, rubs, or gallops  Gastrointestinal: Positive bowel sounds, soft, nontender, nondistended  Musculoskeletal: No bilateral ankle edema  Psychiatric: Appropriate affect, cooperative  Neurologic: Oriented x 3, strength symmetric in all extremities, Cranial Nerves grossly intact to confrontation, speech clear  Skin: No rashes    Pertinent  and/or Most Recent Results     LAB RESULTS:      Lab 04/11/23  0421 04/10/23  1830   WBC 10.21 10.81*   HEMOGLOBIN 11.5* 13.6   HEMATOCRIT 35.1 42.9   PLATELETS 216 250   NEUTROS ABS 7.34* 8.30*   IMMATURE GRANS (ABS) 0.04 0.04   LYMPHS ABS 1.60 1.46   MONOS ABS 1.09* 0.91*   EOS ABS 0.09 0.05   MCV 93.4 93.9   PROCALCITONIN  --  0.05   LACTATE  --  0.8         Lab 04/11/23  0421 04/10/23  1830   SODIUM 132* 135*   POTASSIUM 3.6 3.9   CHLORIDE 98 98   CO2 28.0 29.0   ANION GAP 6.0 8.0   BUN 6* 5*   CREATININE 0.50* 0.49*   EGFR 106.9 107.4   GLUCOSE 114* 105*   CALCIUM 8.8 9.0   MAGNESIUM  --  2.0   TSH  --  0.828         Lab 04/10/23  1830   TOTAL PROTEIN 7.3   ALBUMIN 4.0   GLOBULIN 3.3   ALT (SGPT) 8   AST (SGOT) 19   BILIRUBIN 0.6   ALK PHOS 118*         Lab 04/10/23  1830   HSTROP T 9                 Brief Urine Lab Results  (Last result in the past 365 days)      Color   Clarity   Blood   Leuk Est   Nitrite   Protein   CREAT   Urine HCG        04/10/23 1834 Yellow   Turbid   Moderate (2+)   Large (3+)   Positive   30 mg/dL (1+)               Microbiology Results (last 10 days)     Procedure Component Value - Date/Time    Urine Culture - Urine, Urine, Clean Catch [[id number]]  (Abnormal) Collected: 04/10/23 1834    Lab Status: Preliminary result Specimen: Urine, Clean Catch Updated: 04/11/23 0804     Urine Culture >100,000 CFU/mL Gram Negative Bacilli     Narrative:      Colonization of the urinary tract without infection is common. Treatment is discouraged unless the patient is symptomatic, pregnant, or undergoing an invasive urologic procedure.            CT Head Without Contrast    Result Date: 4/10/2023  CT HEAD WO CONTRAST Date of Exam: 4/10/2023 8:21 PM EDT Indication: Altered mental status. Comparison: None available. Technique: Axial CT images were obtained of the head without contrast administration.  Reconstructed coronal and sagittal images were also obtained. Automated exposure control and iterative construction methods were used. Findings: There is no evidence of hemorrhage. There is no mass effect or midline shift. There is no extracerebral collection. Ventricles are normal in size and configuration for patient's stated age.  Posterior fossa is within normal limits. Calvarium and skull base appear intact.  Visualized sinuses show no air fluid levels. Visualized orbits are unremarkable.     Impression: No acute intracranial abnormality. Electronically Signed: Angelo Seals  4/10/2023 8:39 PM EDT  Workstation ID: [id number]    XR Chest 1 View    Result Date: 4/10/2023  XR CHEST 1 VW Date of Exam: 4/10/2023 5:48 PM EDT Indication: Weak/Dizzy/AMS triage protocol. Comparison: None available. FINDINGS: No focal airspace opacity. No pleural effusion or pneumothorax. Normal heart and mediastinal contours.     No evidence of acute disease in the chest. Electronically Signed: Stanford Rosenberg  4/10/2023 6:18 PM EDT  Workstation ID: [id number]                  Plan for Follow-up of Pending Labs/Results:   Pending Labs     Order Current Status    Blood Culture - Blood, Arm, Right In process    Blood Culture - Blood, Hand, Right In process    Urine Culture - Urine, Urine, Clean Catch Preliminary result        Discharge Details        Discharge Medications      New Medications      Instructions Start Date   cefuroxime 250 MG tablet  Commonly known as: CEFTIN   250 mg,  Oral, 2 Times Daily         Continue These Medications      Instructions Start Date   cyclobenzaprine 10 MG tablet  Commonly known as: FLEXERIL   10 mg, Oral, 3 Times Daily PRN      D-Mannose 500 MG capsule   1 capsule, Oral, 2 Times Daily      gabapentin 800 MG tablet  Commonly known as: NEURONTIN   800 mg, Oral, 3 Times Daily      guaiFENesin 600 MG 12 hr tablet  Commonly known as: MUCINEX   1,200 mg, Oral, 2 Times Daily      Suboxone 2-0.5 MG film film  Generic drug: buprenorphine-naloxone   0.25 films, Sublingual, Daily, Patient takes 0.25 film QID PRN for pain      traMADol 50 MG tablet  Commonly known as: ULTRAM   50 mg, Oral, 2 Times Daily             Allergies   Allergen Reactions   • Sulfa Antibiotics Hives         Discharge Disposition:      Diet:  Hospital:  Diet Order   Procedures   • Diet: Regular/House Diet; Texture: Regular Texture (IDDSI 7); Fluid Consistency: Thin (IDDSI 0)       Activity:      Restrictions or Other Recommendations:         CODE STATUS:    Code Status and Medical Interventions:   Ordered at: 04/10/23 7280     Level Of Support Discussed With:    Patient    Next of Kin (If No Surrogate)     Code Status (Patient has no pulse and is not breathing):    CPR (Attempt to Resuscitate)     Medical Interventions (Patient has pulse or is breathing):    Full Support       No future appointments.    Additional Instructions for the Follow-ups that You Need to Schedule     Discharge Follow-up with PCP   As directed       Currently Documented PCP:    Provider, No Known    PCP Phone Number:    None     Follow Up Details: 1-2 week hospital follow up                     Leandra Chisholm II, DO  04/11/23      Time Spent on Discharge:  I spent  34  minutes on this discharge activity which included: face-to-face encounter with the patient, reviewing the data in the system, coordination of the care with the nursing staff as well as consultants, documentation, and entering orders.

## 2023-04-11 NOTE — THERAPY EVALUATION
Acute Care - Speech Language Pathology   Swallow Initial Evaluation [facility]   Clinical Swallow Evaluation       Patient Name: June Rodriguez  : 1960  MRN: [id number]  Today's Date: 2023               Admit Date: 4/10/2023    Visit Dx:     ICD-10-CM ICD-9-CM   1. Acute UTI  N39.0 599.0   2. Confusion  R41.0 298.9   3. Recurrent UTI  N39.0 599.0   4. Head and neck cancer  C76.0 195.0   5. 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 for Method of Medication Administration: meds whole, with thin liquids, meds crushed, with puree, as tolerated (23)     Monitor for Signs of Aspiration: yes, notify SLP if any concerns (23)  Recommended Diagnostics: No further SLP services recommended (23)  Swallow Criteria for Skilled Therapeutic Interventions Met: no problems identified which require skilled intervention (23)  Anticipated Discharge Disposition (SLP): unknown (23)  Rehab Potential/Prognosis, Swallowing: good, to achieve stated therapy goals (23)  Therapy Frequency (Swallow): evaluation only (23)                                                  SWALLOW EVALUATION  (last 72 hours)     SLP Adult Swallow Evaluation     Row Name 04/11/23 7769                   Rehab Evaluation    Document Type evaluation  -        Subjective Information no complaints  -        Patient Observations alert;cooperative  -        Patient/Family/Caregiver Comments/Observations No family present  -        Patient Effort good  -        Symptoms Noted During/After Treatment none  -           General Information    Patient Profile Reviewed yes  -        Pertinent History Of Current Problem Adm w/ altered mental status. Hx: tobacco use, frequent UTIs, prior squamous cell carcinoma of the head/neck s/p resection (2018), DDD. Head CT negative for acute intracranial abnormalities  -        Current Method of Nutrition regular textures;thin liquids  -        Precautions/Limitations, Vision WFL;for purposes of eval  -        Precautions/Limitations, Hearing WFL;for purposes of eval  -        Prior Level of Function-Communication unknown  -        Prior Level of Function-Swallowing no diet consistency restrictions;other (see comments)  per pt report  -        Plans/Goals Discussed with patient;agreed upon  -        Barriers to Rehab none identified  -        Patient's Goals for Discharge patient did not state  -           Pain    Additional Documentation Pain Scale: FACES Pre/Post-Treatment (Group)  -           Pain Scale: FACES Pre/Post-Treatment    Pain: FACES Scale, Pretreatment 0-->no hurt  -        Posttreatment Pain Rating 0-->no hurt  -           Oral Motor Structure and Function    Dentition Assessment missing teeth  -        Secretion Management WNL/WFL  -        Mucosal Quality moist, healthy  -           Oral Musculature and Cranial Nerve Assessment    Oral Motor General Assessment [facility]  -           General Eating/Swallowing Observations    Respiratory Support Currently in Use room air  -        Eating/Swallowing Skills self-fed;fed by SLP  -         Positioning During Eating upright in bed  -        Utensils Used spoon;cup;straw  -        Consistencies Trialed thin liquids;pureed;regular textures  -           Clinical Swallow Eval    Pharyngeal Phase no overt signs/symptoms of pharyngeal impairment  -        Clinical Swallow Evaluation Summary No overt s/s of aspiration accross trials of thin liquid, pureed, or regular solid consistencies; even when pushed for consecutive sips of thin liquid. Pt reports intermittent globus w/ PO meds @ baseline, denies any additional concerns. Ok to continue regular diet w/ thin liquids, general aspiration/reflux precautions. Meds whole w/ thin or crushed in pureed/applesauce as tolerated.  SLP will sign off for dysphagia, please reconsult if further concerns arise  -           SLP Evaluation Clinical Impression    SLP Swallowing Diagnosis swallow WFL/no suspected pharyngeal impairment  -        Functional Impact no impact on function  -        Rehab Potential/Prognosis, Swallowing good, to achieve stated therapy goals  -        Swallow Criteria for Skilled Therapeutic Interventions Met no problems identified which require skilled intervention  -           Recommendations    Therapy Frequency (Swallow) evaluation only  -        SLP Diet Recommendation regular textures;thin liquids  -        Recommended Diagnostics No further SLP services recommended  -        Recommended Precautions and Strategies general aspiration precautions;reflux precautions  -        Oral Care Recommendations Oral Care BID/PRN  -        SLP Rec. for Method of Medication Administration meds whole;with thin liquids;meds crushed;with puree;as tolerated  -        Monitor for Signs of Aspiration yes;notify SLP if any concerns  -        Anticipated Discharge Disposition (SLP) unknown  -              User Key  (r) = Recorded By, (t) = Taken By, (c) = Cosigned By    Initials Name Effective Dates     Sonya Craig, MS,  DYAN-SLP 06/22/22 -                 EDUCATION  The patient has been educated in the following areas:   Dysphagia (Swallowing Impairment).              Time Calculation:    Time Calculation- SLP     Row Name 04/11/23 1421             Time Calculation- SLP    SLP Start Time 1105  -      SLP Received On 04/11/23  -         Untimed Charges    [id number]-[id number] Eval Oral Pharyng Swallow Minutes 40  -MH         Total Minutes    Untimed Charges Total Minutes 40  -MH       Total Minutes 40  -MH            User Key  (r) = Recorded By, (t) = Taken By, (c) = Cosigned By    Initials Name Provider Type     Sonya Craig MS, CFY-SLP Speech and Language Pathologist                Therapy Charges for Today     Code Description Service Date Service Provider Modifiers Qty    [id number] HC ST EVAL ORAL PHARYNG SWALLOW 3 4/11/2023 Sonya Craig MS, CFY-SLP GN 1               Sonya Craig MS, DYAN-SLP  4/11/2023

## 2023-04-11 NOTE — H&P
[facility]  HISTORY AND PHYSICAL    Patient Name: June Rodriguez  : 1960  MRN: [id number]  Primary Care Physician: Provider, No Known  Date of admission: 4/10/2023    Subjective   Subjective     Chief Complaint:  Altered mental status & UTI    HPI:  June Rodriguez is a 63 y.o. female tobacco use disorder, abnormal weight loss, gallstones, chronic back pain, DDD, frequent UTIs, scoliosis, prior squamous cell carcinoma of the head/neck status post surgical resection, who presented for evaluation of altered mental status and UTI symptoms.  Patient reports cloudy and foul-smelling urine with associated dysuria for the past 1 to 2 weeks.  Associated with suprapubic abdominal pain, headaches, presyncopal symptoms, nausea.  Also with confusion and tangential speech.  Denied any fevers, chills, cough, congestion, chest pain, shortness of breath, vomiting, diarrhea, constipation, sick contacts.  Recently moved here from [place].  The patient's sister is at bedside and patient okay with her receiving updates.  The patient's sister reports that the patient has been more confused and tangential.  Also, reports weight loss from 130 to 112 pounds over the past year.    Per outside records, patient was admitted in 2023 to an outside facility for sepsis secondary to acute bacterial pneumonia, cystitis, and acute gastroenteritis.  Per discharge summary she was discharged on Augmentin, but on review of medication list she was discharged on azithromycin and Keflex.    In the ER, patient was afebrile, heart rate 111, respiratory rate 18, blood pressure 122/58, satting 96% on room air.  UA with blood, protein, leuk esterase, nitrate, too numerous to count WBCs, 4+ bacteria, 3-6 squamous cells, UDS positive for TCAs and buprenorphine, CMP with BUN 5, creatinine 0.49, white blood cell count 10.81k with neutrophilic predominance, ammonia 16, Tylenol and salicylates and ethanol levels  all negative, lactic 0.8.  CT head with no acute intracranial abnormality, chest x-ray with no acute disease.  Urine culture was sent.  She was given a dose of cefepime and admitted for further evaluation.    Review of Systems   Constitutional: Positive for unexpected weight change. Negative for fever.   HENT: Negative for congestion, rhinorrhea and sore throat.    Eyes: Negative for pain and redness.   Respiratory: Negative for cough and shortness of breath.    Cardiovascular: Negative for chest pain, palpitations and leg swelling.   Gastrointestinal: Positive for abdominal pain and nausea. Negative for blood in stool, constipation, diarrhea and vomiting.   Genitourinary: Positive for dysuria. Negative for hematuria.   Musculoskeletal: Positive for arthralgias.   Skin: Negative for rash and wound.   Neurological: Positive for light-headedness and headaches. Negative for numbness.   Psychiatric/Behavioral: Positive for confusion. Negative for hallucinations.            Personal History     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              Past Surgical History:   Procedure Laterality Date   • FACIAL PROCEDURE     • HYSTERECTOMY     • KNEE SURGERY Bilateral        Family History:  family history includes Alzheimer's disease in her father; Coronary artery disease in her mother.     Social History:  reports that she has been smoking cigarettes. She has a 20.00 pack-year smoking history. She has never used smokeless tobacco. She reports that she does not currently use alcohol. She reports that she does not use drugs.  Social History     Social History Narrative   • Not on file       Medications:  D-Mannose, buprenorphine-naloxone, cyclobenzaprine, gabapentin, guaiFENesin, and  traMADol    Allergies   Allergen Reactions   • Sulfa Antibiotics Hives       Objective   Objective     Vital Signs:   Temp:  [98.6 °F (37 °C)-98.9 °F (37.2 °C)] 98.9 °F (37.2 °C)  Heart Rate:  [105-111] 105  Resp:  [18-22] 22  BP: (122)/(58-66) 122/66    Physical Exam   Constitutional: No acute distress, awake, alert, sitting up in bed  HENT: NCAT, mucous membranes moist, well-healed surgical incision  Respiratory: Clear to auscultation bilaterally, respiratory effort normal   Cardiovascular: RRR, no murmurs, rubs, or gallops  Gastrointestinal: Normoactive bowel sounds, soft, tender to palpation in the suprapubic region only, nondistended, no guarding, no rebound  Musculoskeletal: No bilateral ankle edema, no significant flank tenderness  Psychiatric: Tangential speech, slightly pressured speech  Neurologic: Oriented x 4 (full name, [facility], [facility], April 2023, reason for hospitalization), strength symmetric in all extremities, Cranial Nerves grossly intact to confrontation, speech clear  Skin: No rashes on exposed arms and legs    Result Review:  I have personally reviewed the results from the time of this admission to 4/10/2023 22:08 EDT and agree with these findings:  []  Laboratory list / accordion  [x]  Microbiology  [x]  Radiology  []  EKG/Telemetry   []  Cardiology/Vascular   []  Pathology  [x]  Old records  []  Other:  Most notable findings include: As per HPI    LAB RESULTS:      Lab 04/10/23  1830   WBC 10.81*   HEMOGLOBIN 13.6   HEMATOCRIT 42.9   PLATELETS 250   NEUTROS ABS 8.30*   IMMATURE GRANS (ABS) 0.04   LYMPHS ABS 1.46   MONOS ABS 0.91*   EOS ABS 0.05   MCV 93.9   PROCALCITONIN 0.05   LACTATE 0.8         Lab 04/10/23  1830   SODIUM 135*   POTASSIUM 3.9   CHLORIDE 98   CO2 29.0   ANION GAP 8.0   BUN 5*   CREATININE 0.49*   EGFR 107.4   GLUCOSE 105*   CALCIUM 9.0   MAGNESIUM 2.0   TSH 0.828         Lab 04/10/23  1830   TOTAL PROTEIN 7.3   ALBUMIN 4.0   GLOBULIN 3.3   ALT (SGPT) 8   AST (SGOT) 19    BILIRUBIN 0.6   ALK PHOS 118*         Lab 04/10/23  1830   HSTROP T 9                 Brief Urine Lab Results  (Last result in the past 365 days)      Color   Clarity   Blood   Leuk Est   Nitrite   Protein   CREAT   Urine HCG        04/10/23 1834 Yellow   Turbid   Moderate (2+)   Large (3+)   Positive   30 mg/dL (1+)               Microbiology Results (last 10 days)     ** No results found for the last 240 hours. **          CT Head Without Contrast    Result Date: 4/10/2023  CT HEAD WO CONTRAST Date of Exam: 4/10/2023 8:21 PM EDT Indication: Altered mental status. Comparison: None available. Technique: Axial CT images were obtained of the head without contrast administration.  Reconstructed coronal and sagittal images were also obtained. Automated exposure control and iterative construction methods were used. Findings: There is no evidence of hemorrhage. There is no mass effect or midline shift. There is no extracerebral collection. Ventricles are normal in size and configuration for patient's stated age.  Posterior fossa is within normal limits. Calvarium and skull base appear intact.  Visualized sinuses show no air fluid levels. Visualized orbits are unremarkable.     Impression: Impression: No acute intracranial abnormality. Electronically Signed: Angelo Seals  4/10/2023 8:39 PM EDT  Workstation ID: [id number]    XR Chest 1 View    Result Date: 4/10/2023  XR CHEST 1 VW Date of Exam: 4/10/2023 5:48 PM EDT Indication: Weak/Dizzy/AMS triage protocol. Comparison: None available. FINDINGS: No focal airspace opacity. No pleural effusion or pneumothorax. Normal heart and mediastinal contours.     Impression: No evidence of acute disease in the chest. Electronically Signed: Stanford Rosenberg  4/10/2023 6:18 PM EDT  Workstation ID: [id number]          Assessment & Plan   Assessment & Plan       Sepsis secondary to UTI    Altered mental status    Chronic pain    63 y.o. female tobacco use disorder, abnormal weight loss,  gallstones, chronic back pain, DDD, frequent UTIs, scoliosis, prior squamous cell carcinoma of the head/neck status post surgical resection, who presented for evaluation of altered mental status and UTI symptoms.  CT head negative.  UDS positive for TCA, buprenorphine. CT head and CXR negative.     Sepsis secondary to UTI  Frequent UTIs  -UA concerning for possible infection, WBC 10.81 with neutrophilic predominance, heart rate 111, respiratory rate 18  -Follow-up urine and blood cultures  -Reviewed outside hospital discharge summary and appeared to have pan-susceptible E. coli UTI last month  -Transition cefepime to ceftriaxone once daily  -LR at 75mL/hr for 10 hours  -Used to follow with urology outpatient in [place], would benefit from outpatient evaluation    Acute metabolic encephalopathy secondary to above  -Also could have component of toxic encephalopathy  -Has tangential speech, slightly pressured speech; is oriented x4; no depressive symptoms per report  -If persists in the morning, recommend psychiatry consultation    Abnormal weight loss  Prior squamous cell carcinoma head and neck status postsurgical resection in 2018  -Reports approximately 15 pound weight loss in the past year; does have a history of squamous cell carcinoma of the head and neck  -Recommend outpatient evaluation with cancer screenings    DDD  Chronic pain  Scoliosis  -Flexeril 10 mg 2 times daily as needed, gabapentin 800 mg 3 times daily, tramadol 50 mg twice daily on outpatient med list   -Reports taking Suboxone 8-2 1 tab 4 times daily; however, discussed with pharmacy and they reviewed her insurance card and she was prescribed Suboxone 8-2 to take one fourth tab 4 times daily as needed --> pharmacist ordered Suboxone 2-0.5 4 times daily as needed  -Will need to confirm in the daytime Suboxone dosing    DVT prophylaxis:  lovenox    CODE STATUS:  FULL  Level Of Support Discussed With: Patient; Next of Kin (If No Surrogate)  Code Status  (Patient has no pulse and is not breathing): CPR (Attempt to Resuscitate)  Medical Interventions (Patient has pulse or is breathing): Full Support      Expected Discharge  Expected Discharge Date and Time     Expected Discharge Date Expected Discharge Time    Apr 12, 2023             This note has been completed as part of a split-shared workflow.     Signature: Electronically signed by Brianna Batista MD, 04/10/23, 10:08 PM EDT

## 2023-04-11 NOTE — CASE MANAGEMENT/SOCIAL WORK
Discharge Planning Assessment  [facility]     Patient Name: June Rodriguez  MRN: [id number]  Today's Date: 4/11/2023    Admit Date: 4/10/2023    Plan: Home at DC   Discharge Needs Assessment     Row Name 04/11/23 0952       Living Environment    People in Home sibling(s)    Current Living Arrangements home    Living Arrangement Comments Recently moved from [place]. Staying with her sister in [place].       Discharge Needs Assessment    Equipment Currently Used at Home none    Equipment Needed After Discharge none    Discharge Coordination/Progress Denies current use of DME, HH or outpt services.               Discharge Plan     Row Name 04/11/23 0953       Plan    Plan Home at DC    Patient/Family in Agreement with Plan yes    Plan Comments I spoke with the pt. She denies any DC needs at this time.    Final Discharge Disposition Code 01 - home or self-care    Row Name 04/11/23 0846       Plan    Final Discharge Disposition Code 01 - home or self-care              Continued Care and Services - Admitted Since 4/10/2023    Coordination has not been started for this encounter.       Expected Discharge Date and Time     Expected Discharge Date Expected Discharge Time    Apr 11, 2023          Demographic Summary    No documentation.                Functional Status     Row Name 04/11/23 0952       Functional Status    Usual Activity Tolerance good       Functional Status, IADL    Medications independent    Meal Preparation independent    Housekeeping independent    Laundry independent    Shopping independent               Psychosocial    No documentation.                Abuse/Neglect    No documentation.                Legal    No documentation.                Substance Abuse    No documentation.                Patient Forms    No documentation.                   Kimberly Pickens RN

## 2023-04-11 NOTE — PLAN OF CARE
Problem: Adult Inpatient Plan of Care  Goal: Plan of Care Review  Outcome: Ongoing, Progressing  Goal: Absence of Hospital-Acquired Illness or Injury  Outcome: Ongoing, Progressing  Goal: Readiness for Transition of Care  Outcome: Ongoing, Progressing  Intervention: Mutually Develop Transition Plan  Recent Flowsheet Documentation  Taken 4/11/2023 0047 by Tracy Jorge, RN  Equipment Currently Used at Home: none  Taken 4/11/2023 0043 by Tracy Jorge, RN  Transportation Anticipated: family or friend will provide  Patient/Family Anticipated Services at Transition: none  Patient/Family Anticipates Transition to: home with family     Problem: Fall Injury Risk  Goal: Absence of Fall and Fall-Related Injury  Outcome: Ongoing, Progressing   Goal Outcome Evaluation:

## 2023-04-11 NOTE — PLAN OF CARE
Goal Outcome Evaluation:      SLP evaluation completed. Will sign-off for dysphagia. Please see note for further details and recommendations.

## 2023-04-12 ENCOUNTER — TRANSITIONAL CARE MANAGEMENT TELEPHONE ENCOUNTER (OUTPATIENT)
Dept: CALL CENTER | Facility: HOSPITAL | Age: 63
End: 2023-04-12
Payer: MEDICARE

## 2023-04-12 LAB — BACTERIA SPEC AEROBE CULT: ABNORMAL

## 2023-04-12 NOTE — OUTREACH NOTE
Call Center TCM Note    Flowsheet Row Responses   [facility] patient discharged from? [place]   Does the patient have one of the following disease processes/diagnoses(primary or secondary)? Sepsis   TCM attempt successful? No   Unsuccessful attempts Attempt 1          Daniela Nino LPN    4/12/2023, 08:57 EDT

## 2023-04-12 NOTE — OUTREACH NOTE
"Call Center TCM Note    Flowsheet Row Responses   [facility] [place] patient discharged from? [place]   Does the patient have one of the following disease processes/diagnoses(primary or secondary)? Sepsis   TCM attempt successful? Yes   Call start time 1508   Call end time 1512   Discharge diagnosis Sepsis secondary to UTI   Meds reviewed with patient/caregiver? Yes   Is the patient having any side effects they believe may be caused by any medication additions or changes? No   Does the patient have all medications related to this admission filled (includes all antibiotics, inhalers, nebulizers,steroids,etc.) Yes   Is the patient taking all medications as directed (includes completed medication regime)? Yes   Does the patient have an appointment with their PCP within 7 days of discharge? Yes   Psychosocial issues? No   Is the patient/caregiver able to teach back TIME? M ental Decline - confused, sleepy, difficult to arouse, I nfection - may have signs and symptoms of an infection, T emperature - higher or lower than normal, E xtremely Ill - severe pain, discomfort, shortness of breath   Nursing interventions Nurse provided patient education   Is patient/caregiver able to teach back steps to recovery at home? Rest and regain strength, Eat a balanced diet, Make a list of questions for PCP appoinment   Is the patient/caregiver able to teach back signs and symptoms of worsening condition: Rapid heart rate (>90), Fever, Hyperthermia, Shortness of breath/rapid respiratory rate, Altered mental status(confusion/coma)   Is the patient/caregiver able to teach back the hierarchy of who to call/visit for symptoms/problems? PCP, Specialist, Home health nurse, Urgent Care, ED, 911 Yes   Additional teach back comments States she is doing better.  \"I'm going and it's clearer and less pain\"   TCM call completed? Yes   Wrap up additional comments Denies questions or needs at this time.   Call end time 1512          Daniela Nino, " LPN    4/12/2023, 15:13 EDT

## 2023-04-13 LAB
QT INTERVAL: 346 MS
QTC INTERVAL: 453 MS

## 2023-04-16 LAB
BACTERIA SPEC AEROBE CULT: NORMAL
BACTERIA SPEC AEROBE CULT: NORMAL

## 2023-04-18 ENCOUNTER — TELEPHONE (OUTPATIENT)
Dept: FAMILY MEDICINE CLINIC | Facility: CLINIC | Age: 63
End: 2023-04-18
Payer: MEDICARE

## 2023-04-18 NOTE — TELEPHONE ENCOUNTER
David from [facility] called to say the patient was referred to Urology from the ER. This patient will be establishing care with Dr. Avitia tomorrow, and Urology is saying they need another positive urine culture for the referral. If possible, they're requesting we get that urine culture tomorrow during the patient's New Patient appointment. The call back number is [phone] if needed.

## 2023-04-19 ENCOUNTER — OFFICE VISIT (OUTPATIENT)
Dept: FAMILY MEDICINE CLINIC | Facility: CLINIC | Age: 63
End: 2023-04-19
Payer: MEDICARE

## 2023-04-19 VITALS
WEIGHT: 114 LBS | OXYGEN SATURATION: 92 % | HEIGHT: 64 IN | BODY MASS INDEX: 19.46 KG/M2 | HEART RATE: 98 BPM | DIASTOLIC BLOOD PRESSURE: 66 MMHG | SYSTOLIC BLOOD PRESSURE: 120 MMHG

## 2023-04-19 DIAGNOSIS — E55.9 VITAMIN D DEFICIENCY: ICD-10-CM

## 2023-04-19 DIAGNOSIS — N39.0 RECURRENT UTI: Primary | ICD-10-CM

## 2023-04-19 DIAGNOSIS — Z00.00 PREVENTATIVE HEALTH CARE: ICD-10-CM

## 2023-04-19 DIAGNOSIS — G89.29 CHRONIC BACK PAIN, UNSPECIFIED BACK LOCATION, UNSPECIFIED BACK PAIN LATERALITY: ICD-10-CM

## 2023-04-19 DIAGNOSIS — Z85.89 HISTORY OF SQUAMOUS CELL CARCINOMA: ICD-10-CM

## 2023-04-19 DIAGNOSIS — Z11.3 SCREENING EXAMINATION FOR STD (SEXUALLY TRANSMITTED DISEASE): ICD-10-CM

## 2023-04-19 DIAGNOSIS — Z86.73 HISTORY OF CVA (CEREBROVASCULAR ACCIDENT): ICD-10-CM

## 2023-04-19 DIAGNOSIS — M54.9 CHRONIC BACK PAIN, UNSPECIFIED BACK LOCATION, UNSPECIFIED BACK PAIN LATERALITY: ICD-10-CM

## 2023-04-19 DIAGNOSIS — M41.9 SCOLIOSIS, UNSPECIFIED SCOLIOSIS TYPE, UNSPECIFIED SPINAL REGION: ICD-10-CM

## 2023-04-19 DIAGNOSIS — F17.210 CIGARETTE NICOTINE DEPENDENCE WITHOUT COMPLICATION: ICD-10-CM

## 2023-04-19 LAB
BILIRUB BLD-MCNC: NEGATIVE MG/DL
CLARITY, POC: ABNORMAL
COLOR UR: YELLOW
EXPIRATION DATE: ABNORMAL
GLUCOSE UR STRIP-MCNC: NEGATIVE MG/DL
KETONES UR QL: NEGATIVE
LEUKOCYTE EST, POC: ABNORMAL
Lab: ABNORMAL
NITRITE UR-MCNC: POSITIVE MG/ML
PH UR: 6 [PH] (ref 5–8)
PROT UR STRIP-MCNC: NEGATIVE MG/DL
RBC # UR STRIP: NEGATIVE /UL
SP GR UR: 1.01 (ref 1–1.03)
UROBILINOGEN UR QL: NORMAL

## 2023-04-19 PROCEDURE — 87081 CULTURE SCREEN ONLY: CPT | Performed by: INTERNAL MEDICINE

## 2023-04-19 PROCEDURE — 87077 CULTURE AEROBIC IDENTIFY: CPT | Performed by: INTERNAL MEDICINE

## 2023-04-19 PROCEDURE — 87186 SC STD MICRODIL/AGAR DIL: CPT | Performed by: INTERNAL MEDICINE

## 2023-04-19 PROCEDURE — 87086 URINE CULTURE/COLONY COUNT: CPT | Performed by: INTERNAL MEDICINE

## 2023-04-19 RX ORDER — VARENICLINE TARTRATE 1 MG/1
1 TABLET, FILM COATED ORAL 2 TIMES DAILY
Qty: 60 TABLET | Refills: 5 | Status: SHIPPED | OUTPATIENT
Start: 2023-04-19

## 2023-04-19 RX ORDER — CEFUROXIME AXETIL 250 MG/1
250 TABLET ORAL 2 TIMES DAILY
Qty: 14 TABLET | Refills: 0 | Status: SHIPPED | OUTPATIENT
Start: 2023-04-19 | End: 2023-04-26

## 2023-04-19 RX ORDER — BUPRENORPHINE HYDROCHLORIDE AND NALOXONE HYDROCHLORIDE DIHYDRATE 8; 2 MG/1; MG/1
1 TABLET SUBLINGUAL DAILY
COMMUNITY

## 2023-04-19 NOTE — PROGRESS NOTES
Chief Complaint   Patient presents with   • Establish Care   • Urinary Tract Infection     Urology wants one more positive UA/Culture for referral. Pt seen at [facility] ED   Admission 4/10/2023 - Discharge 4/11/2023    • Weight Loss     Unintentional    • Scoliosis     Previously was seeing pain management and would like a new referral.        HPI:  June Rodriguez is a 63 y.o. female who presents today for establish care.  Recently admitted to [facility] for UTI.  She reports a history of recurrent UTIs since 2007 during a hospitalization for surgery.  She has chronic back pain due to scoliosis, currently on Suboxone, gabapentin, tramadol and Flexeril.    History of squamous cell carcinoma of the mouth.    Recently moved from [place].    ROS:  Constitutional: no fevers, night sweats or unexplained weight loss  Eyes: no vision changes  ENT: no runny nose, ear pain, sore throat  Cardio: no chest pain, palpitations  Pulm: no shortness of breath, wheezing, or cough  GI: no abdominal pain or changes in bowel movements  : + difficulty urinating  MSK: no difficulty ambulating, no joint pain  Neuro: no weakness, dizziness or headache  Psych: no trouble sleeping  Endo: no change in appetite      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       Family History   Problem Relation Age of Onset   • Coronary artery disease Mother    • Alzheimer's disease Father       Social History     Socioeconomic History   • Marital status: Single   Tobacco Use   • Smoking status: Some Days     Packs/day: 0.50     Years: 40.00     Pack years: 20.00     Types: Cigarettes   • Smokeless tobacco: Never   Vaping Use   • Vaping Use: Some days   • Substances: Nicotine   • Devices: Disposable   • Passive vaping  exposure: Yes   Substance and Sexual Activity   • Alcohol use: Not Currently   • Drug use: Never   • Sexual activity: Defer      Allergies   Allergen Reactions   • Sulfa Antibiotics Hives      Immunization History   Administered Date(s) Administered   • Influenza, Unspecified 10/31/2017        PE:  Vitals:    04/19/23 1007   BP: 120/66   Pulse: 98   SpO2: 92%      Body mass index is 19.57 kg/m².    Gen Appearance: NAD  HEENT: Normocephalic, PERRLA, no thyromegaly, trache midline  Heart: RRR, normal S1 and S2, no murmur  Lungs: CTA b/l, no wheezing, no crackles  Abdomen: Soft, non-tender, non-distended, no guarding and BSx4  MSK: Moves all extremities well, normal gait, no peripheral edema  Pulses: Palpable and equal b/l  Lymph nodes: No palpable lymphadenopathy   Neuro: No focal deficits      Current Outpatient Medications   Medication Sig Dispense Refill   • buprenorphine-naloxone (SUBOXONE) 8-2 MG per SL tablet Place 1 tablet under the tongue Daily.     • cyclobenzaprine (FLEXERIL) 10 MG tablet Take 1 tablet by mouth 2 (Two) Times a Day As Needed for Muscle Spasms.     • gabapentin (NEURONTIN) 800 MG tablet Take 1 tablet by mouth 3 (Three) Times a Day.     • traMADol (ULTRAM) 50 MG tablet Take 1 tablet by mouth 2 (Two) Times a Day.     • cefuroxime (CEFTIN) 250 MG tablet Take 1 tablet by mouth 2 (Two) Times a Day for 7 days. 14 tablet 0   • varenicline (Chantix) 1 MG tablet Take 1 tablet by mouth 2 (Two) Times a Day. 60 tablet 5     No current facility-administered medications for this visit.      Current outpatient and discharge medications have been reconciled for the patient.  Reviewed by: Brendan Avitia,     Urine suspicious for UTI today, sending for culture.  Recommend establishing care with urology.  She has referrals pending for oncology and ENT for history of squamous cell carcinoma.  Recent weight loss of 10-15 lbs over the last few months.  We will try to get her into pain management, currently  on Suboxone so she may need to see a Suboxone provider as well.    Chantix for tobacco cessation.    Recommend checking lab work and follow-up in 3 months after establishing with specialist for annual physical.    Diagnoses and all orders for this visit:    1. Recurrent UTI (Primary)  -     POC Urinalysis Dipstick, Automated  -     Urine Culture - Urine, Urine, Clean Catch; Future  -     cefuroxime (CEFTIN) 250 MG tablet; Take 1 tablet by mouth 2 (Two) Times a Day for 7 days.  Dispense: 14 tablet; Refill: 0  -     Urine Culture - Urine, Urine, Clean Catch    2. Screening examination for STD (sexually transmitted disease)  -     Cancel: Chlamydia trachomatis, Neisseria gonorrhoeae, Trichomonas vaginalis, PCR - Swab, Vagina; Future  -     Cancel: Hepatitis C Antibody; Future  -     Cancel: RPR; Future  -     Cancel: HIV-1 / O / 2 Ag / Antibody 4th Generation; Future  -     Cancel: Chlamydia trachomatis, Neisseria gonorrhoeae, Trichomonas vaginalis, PCR - Urine, Urine, Clean Catch; Future    3. History of squamous cell carcinoma  -     Ambulatory Referral to Oncology    4. History of CVA (cerebrovascular accident)    5. Scoliosis, unspecified scoliosis type, unspecified spinal region    6. Chronic back pain, unspecified back location, unspecified back pain laterality    7. Preventative health care  -     CBC & Differential; Future  -     Comprehensive Metabolic Panel; Future  -     Hemoglobin A1c; Future  -     Lipid Panel; Future  -     TSH+Free T4; Future  -     Urinalysis With Culture If Indicated - Urine, Clean Catch; Future    8. Cigarette nicotine dependence without complication  -     varenicline (Chantix) 1 MG tablet; Take 1 tablet by mouth 2 (Two) Times a Day.  Dispense: 60 tablet; Refill: 5    9. Vitamin D deficiency  -     Vitamin D,25-Hydroxy; Future         Return in about 3 months (around 7/19/2023).     Dictated Utilizing Dragon Dictation    Please note that portions of this note were completed with a  voice recognition program.    Part of this note may be an electronic transcription/translation of spoken language to printed text using the Dragon Dictation System.

## 2023-04-23 LAB — BACTERIA SPEC AEROBE CULT: ABNORMAL

## 2023-04-24 ENCOUNTER — TELEPHONE (OUTPATIENT)
Dept: FAMILY MEDICINE CLINIC | Facility: CLINIC | Age: 63
End: 2023-04-24
Payer: MEDICARE

## 2023-04-24 DIAGNOSIS — N39.0 RECURRENT UTI: ICD-10-CM

## 2023-04-24 DIAGNOSIS — Z16.24 CARBAPENEM-RESISTANT BACTERIAL INFECTION: Primary | ICD-10-CM

## 2023-04-24 DIAGNOSIS — A49.8 CARBAPENEM-RESISTANT BACTERIAL INFECTION: Primary | ICD-10-CM

## 2023-04-24 RX ORDER — CIPROFLOXACIN 500 MG/1
500 TABLET, FILM COATED ORAL 2 TIMES DAILY
Qty: 14 TABLET | Refills: 0 | Status: SHIPPED | OUTPATIENT
Start: 2023-04-24 | End: 2023-05-01

## 2023-04-24 NOTE — TELEPHONE ENCOUNTER
----- Message from Brendan Avitia DO sent at 4/24/2023  9:02 AM EDT -----  Multidrug-resistant bacteria on urine culture results.  I sent in a new antibiotic to the pharmacy, please inform pt.  We will need to check on her urology referral, she was waiting on another positive urine culture result before able to make an appointment.  I have referred her to infectious disease as well.    Hub can relay and document.

## 2023-04-24 NOTE — TELEPHONE ENCOUNTER
Urine Culture - Urine, Urine, Clean Catch   >100,000 CFU/mL Enterobacter cloacae complex CRE Abnormal

## 2023-04-25 DIAGNOSIS — M54.9 CHRONIC BACK PAIN, UNSPECIFIED BACK LOCATION, UNSPECIFIED BACK PAIN LATERALITY: Primary | ICD-10-CM

## 2023-04-25 DIAGNOSIS — M41.9 SCOLIOSIS, UNSPECIFIED SCOLIOSIS TYPE, UNSPECIFIED SPINAL REGION: ICD-10-CM

## 2023-04-25 DIAGNOSIS — G89.29 CHRONIC BACK PAIN, UNSPECIFIED BACK LOCATION, UNSPECIFIED BACK PAIN LATERALITY: Primary | ICD-10-CM

## 2023-04-26 ENCOUNTER — TELEPHONE (OUTPATIENT)
Dept: FAMILY MEDICINE CLINIC | Facility: CLINIC | Age: 63
End: 2023-04-26
Payer: MEDICARE

## 2023-04-26 NOTE — TELEPHONE ENCOUNTER
Patient called to say her infectious disease referral was sent to [place] instead of [place]. She would like to see Dr. Fuad Lewis at [facility] here in [place], if possible.

## 2023-05-01 NOTE — TELEPHONE ENCOUNTER
Spoke with patient about infectious disease and pain management referral. Made her aware offices will be reaching out. She gave verbal understanding.

## 2023-05-04 ENCOUNTER — TRANSCRIBE ORDERS (OUTPATIENT)
Dept: LAB | Facility: HOSPITAL | Age: 63
End: 2023-05-04
Payer: MEDICARE

## 2023-05-04 ENCOUNTER — LAB (OUTPATIENT)
Dept: LAB | Facility: HOSPITAL | Age: 63
End: 2023-05-04
Payer: MEDICARE

## 2023-05-04 DIAGNOSIS — B96.89 BACTERIAL CHOLANGITIS: ICD-10-CM

## 2023-05-04 DIAGNOSIS — N39.0 URINARY TRACT INFECTION WITHOUT HEMATURIA, SITE UNSPECIFIED: ICD-10-CM

## 2023-05-04 DIAGNOSIS — K83.09 BACTERIAL CHOLANGITIS: ICD-10-CM

## 2023-05-04 DIAGNOSIS — N39.0 URINARY TRACT INFECTION WITHOUT HEMATURIA, SITE UNSPECIFIED: Primary | ICD-10-CM

## 2023-05-04 DIAGNOSIS — Z16.19: ICD-10-CM

## 2023-05-04 LAB
BACTERIA UR QL AUTO: NORMAL /HPF
BILIRUB UR QL STRIP: NEGATIVE
CLARITY UR: CLEAR
COLOR UR: YELLOW
GLUCOSE UR STRIP-MCNC: NEGATIVE MG/DL
HGB UR QL STRIP.AUTO: NEGATIVE
HYALINE CASTS UR QL AUTO: NORMAL /LPF
KETONES UR QL STRIP: NEGATIVE
LEUKOCYTE ESTERASE UR QL STRIP.AUTO: NEGATIVE
NITRITE UR QL STRIP: NEGATIVE
PH UR STRIP.AUTO: 6 [PH] (ref 5–8)
PROT UR QL STRIP: NEGATIVE
RBC # UR STRIP: NORMAL /HPF
REF LAB TEST METHOD: NORMAL
SP GR UR STRIP: 1.01 (ref 1–1.03)
SQUAMOUS #/AREA URNS HPF: NORMAL /HPF
UROBILINOGEN UR QL STRIP: NORMAL
WBC # UR STRIP: NORMAL /HPF

## 2023-05-04 PROCEDURE — 87086 URINE CULTURE/COLONY COUNT: CPT

## 2023-05-04 PROCEDURE — 81001 URINALYSIS AUTO W/SCOPE: CPT

## 2023-05-05 LAB — BACTERIA SPEC AEROBE CULT: NO GROWTH

## 2023-05-09 ENCOUNTER — OFFICE VISIT (OUTPATIENT)
Dept: UROLOGY | Facility: CLINIC | Age: 63
End: 2023-05-09
Payer: MEDICARE

## 2023-05-09 VITALS — HEART RATE: 100 BPM | OXYGEN SATURATION: 96 % | BODY MASS INDEX: 19.46 KG/M2 | HEIGHT: 64 IN | WEIGHT: 114 LBS

## 2023-05-09 DIAGNOSIS — N39.0 URINARY TRACT INFECTION WITHOUT HEMATURIA, SITE UNSPECIFIED: ICD-10-CM

## 2023-05-09 DIAGNOSIS — Z87.440 HISTORY OF RECURRENT UTI (URINARY TRACT INFECTION): Primary | ICD-10-CM

## 2023-05-09 LAB
BILIRUB BLD-MCNC: NEGATIVE MG/DL
CLARITY, POC: CLEAR
COLOR UR: YELLOW
EXPIRATION DATE: ABNORMAL
GLUCOSE UR STRIP-MCNC: NEGATIVE MG/DL
KETONES UR QL: NEGATIVE
LEUKOCYTE EST, POC: ABNORMAL
Lab: ABNORMAL
NITRITE UR-MCNC: NEGATIVE MG/ML
PH UR: 6 [PH] (ref 5–8)
PROT UR STRIP-MCNC: NEGATIVE MG/DL
RBC # UR STRIP: NEGATIVE /UL
SP GR UR: 1.01 (ref 1–1.03)
UROBILINOGEN UR QL: NORMAL

## 2023-05-09 PROCEDURE — 81003 URINALYSIS AUTO W/O SCOPE: CPT | Performed by: NURSE PRACTITIONER

## 2023-05-09 PROCEDURE — 51798 US URINE CAPACITY MEASURE: CPT | Performed by: NURSE PRACTITIONER

## 2023-05-09 PROCEDURE — 99203 OFFICE O/P NEW LOW 30 MIN: CPT | Performed by: NURSE PRACTITIONER

## 2023-05-09 PROCEDURE — 1159F MED LIST DOCD IN RCRD: CPT | Performed by: NURSE PRACTITIONER

## 2023-05-09 PROCEDURE — 1160F RVW MEDS BY RX/DR IN RCRD: CPT | Performed by: NURSE PRACTITIONER

## 2023-05-09 RX ORDER — BUPRENORPHINE HYDROCHLORIDE 8 MG/1
TABLET SUBLINGUAL
COMMUNITY

## 2023-05-09 RX ORDER — CIPROFLOXACIN 500 MG/1
TABLET, FILM COATED ORAL
COMMUNITY

## 2023-05-09 RX ORDER — ASCORBIC ACID 1000 MG
1 TABLET ORAL EVERY 12 HOURS SCHEDULED
COMMUNITY
Start: 2023-05-04

## 2023-05-09 RX ORDER — ESTRADIOL 0.1 MG/G
4 CREAM VAGINAL 3 TIMES WEEKLY
Qty: 42.5 G | Refills: 12 | Status: SHIPPED | OUTPATIENT
Start: 2023-05-10

## 2023-05-09 RX ORDER — METHENAMINE HIPPURATE 1000 MG/1
1 TABLET ORAL EVERY 12 HOURS SCHEDULED
COMMUNITY
Start: 2023-05-04

## 2023-05-09 NOTE — PROGRESS NOTES
[facility] Female Office Visit      Patient Name: June Rodriguez  : 1960   MRN: [id number]     Chief Complaint:  Lower Urinary Tract Symptoms.     Referring Provider: Leandra Chisholm II, DO    History of Present Illness: Mrs. Rodriguez is a 63 y.o. female who recently moved from [place] with PMH of gallstones, chronic back pain and prior squamous cell carcinoma of the head/neck status post surgical resection with history of recurrent urinary tract infections. She reports seeing a Urologist over 10 years ago but she does not remember who. She was recently seen by [facility], Dr. Herrera Fang for recurrent urinary tract infections. She was initiated on Hiprex 1 g BID and Vitamin C for UTI prophylaxis.     She reports malodorous urine when she has a UTI. She feels her UTIs began after Hysterectomy in .     Urine Culture:  23; no growth.  23; Enterobacter Clocae.  04/10/23; Citrobacter Freundii.    She feels that she is emptying her bladder well.   Bladder scan PVR 99cc.     UA trace leuks today. Will send for urine culture.   Subjective      Review of System: Review of Systems   Genitourinary: Negative for decreased urine volume, difficulty urinating, dysuria, enuresis, flank pain, frequency, hematuria and urgency.      I have reviewed the ROS documented by my clinical staff, updated appropriate and I agree. 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    Over due   • FACIAL PROCEDURE     • HYSTERECTOMY     • KNEE SURGERY Bilateral        Medications:    Current Outpatient Medications:   •  buprenorphine (SUBUTEX) 8 MG sublingual tablet SL tablet, BUPRENORPHINE HCL 8 MG SUBL, Disp: , Rfl:   •  ciprofloxacin (CIPRO) 500 MG tablet, CIPRO 500 MG TABS, Disp: , Rfl:   •  [facility] Vitamin C 1000 MG tablet, Take 1 tablet by mouth Every 12 (Twelve) Hours., Disp: , Rfl:   •  cyclobenzaprine (FLEXERIL) 10 MG tablet, Take 1 tablet by mouth 2 (Two) Times a Day As Needed for Muscle Spasms., Disp: , Rfl:   •  gabapentin (NEURONTIN) 800 MG tablet, Take 1 tablet by mouth 3 (Three) Times a Day., Disp: , Rfl:   •  methenamine (HIPREX) 1 g tablet, Take 1 tablet by mouth Every 12 (Twelve) Hours., Disp: , Rfl:   •  traMADol (ULTRAM) 50 MG tablet, Take 1 tablet by mouth 2 (Two) Times a Day., Disp: , Rfl:   •  traMADol-acetaminophen (ULTRACET) 37.5-325 MG per tablet, Take 1 tablet by mouth 2 (Two) Times a Day., Disp: , Rfl:   •  varenicline (Chantix) 1 MG tablet, Take 1 tablet by mouth 2 (Two) Times a Day., Disp: 60 tablet, Rfl: 5  •  buprenorphine-naloxone (SUBOXONE) 8-2 MG per SL tablet, Place 1 tablet under the tongue Daily., Disp: , Rfl:   •  [START ON 5/10/2023] estradiol (ESTRACE) 0.1 MG/GM vaginal cream, Insert 4 g into the vagina 3 (Three) Times a Week. Insert pea sized amount into vagina M-W-F., Disp: 42.5 g, Rfl: 12    Allergies:  Allergies   Allergen Reactions   • Sulfa Antibiotics Hives       Social History:  Social History     Socioeconomic History   • Marital status: Single   Tobacco Use   • Smoking status: Every Day     Packs/day: 0.50     Years: 45.00     Pack years: 22.50     Types: Cigarettes     Start date: 1/1/1978     Passive exposure: Current   • Smokeless tobacco: Never   Vaping Use   • Vaping Use: Some days   • Substances: Nicotine   • Devices: Disposable   • Passive vaping exposure: Yes  "  Substance and Sexual Activity   • Alcohol use: Not Currently   • Drug use: Never   • Sexual activity: Not Currently     Partners: Male     Birth control/protection: Abstinence, Hysterectomy     Comment: None       Family History:  Family History   Problem Relation Age of Onset   • Coronary artery disease Mother    • Heart disease Mother    • Hypertension Mother    • Alzheimer's disease Father          PVR:   99cc     Objective     Physical Exam:   Vital Signs:   Vitals:    05/09/23 0924   Pulse: 100   SpO2: 96%   Weight: 51.7 kg (114 lb)   Height: 162.6 cm (64.02\")   PainSc: 0-No pain     Body mass index is 19.56 kg/m².     Physical Exam  Vitals and nursing note reviewed.   Constitutional:       Appearance: Normal appearance.   HENT:      Head: Normocephalic and atraumatic.      Nose: Nose normal.      Mouth/Throat:      Mouth: Mucous membranes are moist.   Eyes:      Pupils: Pupils are equal, round, and reactive to light.   Pulmonary:      Effort: Pulmonary effort is normal.   Abdominal:      General: Abdomen is flat.      Palpations: Abdomen is soft.   Musculoskeletal:         General: Normal range of motion.      Cervical back: Normal range of motion.   Skin:     General: Skin is warm and dry.      Capillary Refill: Capillary refill takes less than 2 seconds.   Neurological:      General: No focal deficit present.      Mental Status: She is alert.   Psychiatric:         Mood and Affect: Mood normal.         Labs:   Brief Urine Lab Results  (Last result in the past 365 days)      Color   Clarity   Blood   Leuk Est   Nitrite   Protein   CREAT   Urine HCG        05/09/23 1006 Yellow   Clear   Negative   Trace   Negative   Negative                 Urine Culture        4/10/2023    18:34 4/19/2023    18:28 5/4/2023    12:13   Urine Culture   Urine Culture >100,000 CFU/mL Citrobacter freundii   >100,000 CFU/mL Enterobacter cloacae complex CRE   No growth          Lab Results   Component Value Date    GLUCOSE 114 (H) " 04/11/2023    CALCIUM 8.8 04/11/2023     (L) 04/11/2023    K 3.6 04/11/2023    CO2 28.0 04/11/2023    CL 98 04/11/2023    BUN 6 (L) 04/11/2023    CREATININE 0.50 (L) 04/11/2023    EGFRIFAFRI 115 03/15/2023    BCR 12.0 04/11/2023    ANIONGAP 6.0 04/11/2023       Lab Results   Component Value Date    WBC 10.21 04/11/2023    HGB 11.5 (L) 04/11/2023    HCT 35.1 04/11/2023    MCV 93.4 04/11/2023     04/11/2023       Images:   CT Head Without Contrast    Result Date: 4/10/2023  Impression: No acute intracranial abnormality. Electronically Signed: Angelo Bozena  4/10/2023 8:39 PM EDT  Workstation ID: [id number]    XR Chest 1 View    Result Date: 4/10/2023  No evidence of acute disease in the chest. Electronically Signed: Stanford Rosenberg  4/10/2023 6:18 PM EDT  Workstation ID: [id number]    CT Abdomen Pelvis Without Contrast    Result Date: 3/15/2023  1.  Slight strandy changes noted around fluid-filled loops of small bowel within the right-sided lower abdomen raising concern for potential enteritis. No CT evidence for bowel obstruction. 2.  Stable reticulonodular airspace opacities within the right lung base with greater degree of consolidative changes within the right lower lobe concerning for infectious/inflammatory pneumonitis. 3.  Cholelithiasis. 4.  Nonspecific slightly hyperdense nodular focus within the proximal gastric lumen. This may represent ingested material, however underlying mass/lesion cannot be excluded. This could be correlated with endoscopy if indicated. 5.  Other findings as above. Electronically signed by:  Abbe Sal MD  3/15/2023 11:26 AM CDT Workstation: [id number] [facility]    CT Angiogram Pulmonary (PE) No Venogram    Result Date: 3/13/2023  1.  No evidence of pulmonary embolism. 2.  Scattered patchy and reticular nodular round glass opacities throughout the mid to lower right lung with peribronchial thickening. Small areas of right perihilar consolidation.  Findings are  nonspecific but concerning for an infectious etiology. Recommend follow-up imaging to resolution. 3.  There are couple prominent mediastinal lymph nodes which may be reactive. Electronically signed by:  Nilton Shay MD  3/13/2023 2:59 AM CDT Workstation: [id number] [facility]    X-Ray Chest 1 View    Result Date: 3/13/2023  Patchy opacification of the right lower lobe concerning for consolidation/pneumonia and respiratory. Please correlate with patient's clinical findings and follow-up for resolution. Electronically signed by:  Christi Duke MD  3/13/2023 12:57 AM CDT Workstation: [id number] [facility]      Measures:   Tobacco:   June Rodriguez  reports that she has been smoking cigarettes. She started smoking about 45 years ago. She has a 22.50 pack-year smoking history. She has been exposed to tobacco smoke. She has never used smokeless tobacco..    I advised her to quit.    Urine Incontinence: Patient reports that she is not currently experiencing any symptoms of urinary incontinence.  Assessment / Plan      Assessment:  Mrs. Rodriguez is a 63 y.o. female who presents today with history of recurrent urinary tract infections.     Diagnoses and all orders for this visit:    1. History of recurrent UTI (urinary tract infection) (Primary)  -     estradiol (ESTRACE) 0.1 MG/GM vaginal cream; Insert 4 g into the vagina 3 (Three) Times a Week. Insert pea sized amount into vagina M-W-F.  Dispense: 42.5 g; Refill: 12  -     Urine Culture - Urine, Urine, Clean Catch  -     POC Urinalysis Dipstick, Automated    2. Urinary tract infection without hematuria, site unspecified  -     Urine Culture - Urine, Urine, Clean Catch  -     POC Urinalysis Dipstick, Automated      Patient Education:   We also discussed in depth the etiology and management of urinary tract infection and recurrent urinary tract infection.  We discussed the important principal that the definition of a diagnosed UTI requires a urine culture.   We discussed that a presumptive diagnosis of UTI cannot be made with just signs symptoms or urinalysis.  We discussed that urinalysis is not sufficient to diagnose a UTI.  We discussed the difference between unresolved versus recurrent urinary tract infection.  We discussed that recurrent UTIs typically defined as greater than 2 UTIs within 6 months or greater than 3 within 1 year.  Discussed that the evaluation for recurrent UTI should include a history physical exam and a urine culture.  We discussed the recurrent infections can be a single complicated UTI versus a reinfection.  We discussed the indication for evaluation of the urinary tract when recurrent infections are complicated, occur frequently, or and symptoms persist long after an infection is eradicated.  We discussed therapies to prevent recurrence of infections.  Discussed conservative therapies including increased hydration appropriate , urinary hygiene, decreasing constipation, improvement in pelvic floor related symptoms, decreasing irritative food/fluid intake.  We discussed topical vaginal estrogen and the role this plays in decreasing the rate of infection.  We discussed that topical vaginal estrogen decreases vaginal pH, normalizes the vaginal chio, and decreases contamination.  We discussed the role of prophylactic anti-biotic and self start anti-biotic.  We discussed mechanisms to prevent infection including oral cranberry supplement, d-mannose, methenamine.   We discussed that methenamine therapy is not a treatment for an active infection and must be discontinued during active infection.  We discussed the side effects.  We discussed the role for laboratory testing when on this therapy.   We discussed that we will unlikely be able to eradicate all infection but our goal is to decrease the number she has per year.  Patient is understanding and agreeable with the plan of care above to decrease the rate of infections.    We also discussed the  importance of timed voiding every 2 hours as well as double voiding to further assist fully emptying her bladder. She is understanding and agreeable with plan of care. She will follow up in 3 months.     Follow Up:   Return for 3 months Ana Valverde .    I spent approximately 35 minutes providing clinical care for this patient; including review of patient's chart and provider documentation, face to face time spent with patient in examination room (obtaining history, performing physical exam, discussing diagnosis and management options), placing orders, and completing patient documentation.     Ana GONZALEZ  [facility] [place]

## 2023-05-11 LAB
BACTERIA UR CULT: NORMAL
BACTERIA UR CULT: NORMAL

## 2023-05-26 ENCOUNTER — CONSULT (OUTPATIENT)
Dept: ONCOLOGY | Facility: CLINIC | Age: 63
End: 2023-05-26
Payer: MEDICARE

## 2023-05-26 VITALS
HEART RATE: 88 BPM | OXYGEN SATURATION: 96 % | SYSTOLIC BLOOD PRESSURE: 111 MMHG | BODY MASS INDEX: 19.84 KG/M2 | DIASTOLIC BLOOD PRESSURE: 69 MMHG | RESPIRATION RATE: 18 BRPM | HEIGHT: 63 IN | TEMPERATURE: 96.8 F | WEIGHT: 112 LBS

## 2023-05-26 DIAGNOSIS — R87.613 HIGH GRADE SQUAMOUS INTRAEPITHELIAL LESION ON CYTOLOGIC SMEAR OF CERVIX (HGSIL): Primary | ICD-10-CM

## 2023-05-26 DIAGNOSIS — C76.0 HEAD AND NECK CANCER: ICD-10-CM

## 2023-05-26 PROCEDURE — 1126F AMNT PAIN NOTED NONE PRSNT: CPT | Performed by: INTERNAL MEDICINE

## 2023-05-26 PROCEDURE — 99204 OFFICE O/P NEW MOD 45 MIN: CPT | Performed by: INTERNAL MEDICINE

## 2023-05-26 NOTE — PROGRESS NOTES
New Patient Office Visit      Date: 2023     Patient Name: June Rodriguez  MRN: [id number]  : 1960  Referring Physician: Brendan Avitia    Chief Complaint: Establish care for head neck cancer    History of Present Illness: June Rodriguez is a pleasant 63 y.o. 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 The patient was initially diagnosed with a head neck cancer in 2018.  She was status post resection.  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  Per records, surgical margins were negative however she did have LVI.  Radiation was offered however patient declined secondary to financial reasons.  She has no recurrence since her initial resection.  She was recently seen by Dr. Valentino over at  who performed CT scans which were negative for metastatic or recurrent disease.  TSH was within normal limits.  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  She has not had a gynecologic exam in a few years.  She does note some new mucus/watery discharge but no gross apparent lesions.  She is otherwise compliant with her home medications    Oncology History:    Oncology/Hematology History    No history exists.       Subjective      Review of Systems:     Constitutional: Negative for fevers, chills, or weight loss  Eyes: Negative for blurred vision or discharge         Ear/Nose/Throat: Negative for difficulty swallowing, sore throat, LAD                                                       Respiratory: Negative for cough, SOA, wheezing                                                                                        Cardiovascular: Negative for chest pain or palpitations                                                                   Gastrointestinal: Negative for nausea, vomiting or diarrhea                                                                     Genitourinary: Negative for dysuria or hematuria                                                                                           Musculoskeletal: Negative for any joint pains or muscle aches                                                                        Neurologic: Negative for any weakness, headaches, dizziness                                                                         Hematologic: Negative for any easy bleeding or bruising                                                                                   Psychiatric: Negative for anxiety or depression                             Past Medical History:   Past Medical History:   Diagnosis Date   • Abnormal Pap smear of cervix 2002   • Bladder infection, chronic    • Chronic pain 04/10/2023   • Clotting disorder 2007    Bleed to knee’s every 15 min   • DDD (degenerative disc disease), cervical    • Frequent UTI    • Hormone disorder 2007    After historectomy   • Osteoarthritis    • Scoliosis    • Squamous cell carcinoma of the head and neck status postsurgical resection in 2018    • STD (sexually transmitted disease) 1986    Hpv,tricomis 2002   • Vaginal infection 2007       Past Surgical History:   Past Surgical History:   Procedure Laterality Date   • CYSTOSCOPY  ?    Over due   • FACIAL PROCEDURE     • HYSTERECTOMY     • KNEE SURGERY Bilateral        Family History:   Family History   Problem Relation Age of Onset   • Coronary artery disease Mother    • Heart disease Mother    • Hypertension Mother    • Alzheimer's disease Father        Social History:   Social History     Socioeconomic History   • Marital status: Single   Tobacco Use   • Smoking status: Every Day     Packs/day: 0.50     Years: 45.00     Pack years: 22.50     Types: Cigarettes      Start date: 1/1/1978     Passive exposure: Current   • Smokeless tobacco: Never   Vaping Use   • Vaping Use: Some days   • Substances: Nicotine   • Devices: Disposable   • Passive vaping exposure: Yes   Substance and Sexual Activity   • Alcohol use: Not Currently   • Drug use: Never   • Sexual activity: Not Currently     Partners: Male     Birth control/protection: Abstinence, Hysterectomy     Comment: None       Medications:     Current Outpatient Medications:   •  buprenorphine (SUBUTEX) 8 MG sublingual tablet SL tablet, BUPRENORPHINE HCL 8 MG SUBL, Disp: , Rfl:   •  buprenorphine-naloxone (SUBOXONE) 8-2 MG per SL tablet, Place 1 tablet under the tongue Daily., Disp: , Rfl:   •  [employer] Vitamin C 1000 MG tablet, Take 1 tablet by mouth Every 12 (Twelve) Hours., Disp: , Rfl:   •  cyclobenzaprine (FLEXERIL) 10 MG tablet, Take 1 tablet by mouth 2 (Two) Times a Day As Needed for Muscle Spasms., Disp: , Rfl:   •  estradiol (ESTRACE) 0.1 MG/GM vaginal cream, Insert 4 g into the vagina 3 (Three) Times a Week. Insert pea sized amount into vagina M-W-F., Disp: 42.5 g, Rfl: 12  •  gabapentin (NEURONTIN) 800 MG tablet, Take 1 tablet by mouth 3 (Three) Times a Day., Disp: , Rfl:   •  methenamine (HIPREX) 1 g tablet, Take 1 tablet by mouth Every 12 (Twelve) Hours., Disp: , Rfl:   •  traMADol (ULTRAM) 50 MG tablet, Take 1 tablet by mouth 2 (Two) Times a Day., Disp: , Rfl:   •  traMADol-acetaminophen (ULTRACET) 37.5-325 MG per tablet, Take 1 tablet by mouth 2 (Two) Times a Day., Disp: , Rfl:   •  varenicline (Chantix) 1 MG tablet, Take 1 tablet by mouth 2 (Two) Times a Day., Disp: 60 tablet, Rfl: 5  •  ciprofloxacin (CIPRO) 500 MG tablet, CIPRO 500 MG TABS (Patient not taking: Reported on 5/26/2023), Disp: , Rfl:     Allergies:   Allergies   Allergen Reactions   • Sulfa Antibiotics Hives       Objective     Physical Exam:  Vital Signs:   Vitals:    05/26/23 1146   BP: 111/69  Comment: LUE   Pulse: 88   Resp: 18   Temp: 96.8 °F (36  "°C)   TempSrc: Infrared   SpO2: 96%  Comment: RA   Weight: 50.8 kg (112 lb)   Height: 160 cm (63\")   PainSc: 0-No pain     Pain Score    05/26/23 1146   PainSc: 0-No pain     ECOG Performance Status: 0 - Asymptomatic    Constitutional: NAD, ECOG 0  Eyes: PERRLA, scleral anicteric  ENT: No LAD, no thyromegaly  Respiratory: CTAB, no wheezing, rales, rhonchi  Cardiovascular: RRR, no murmurs, pulses 2+ bilaterally  Abdomen: soft, NT/ND, no HSM  Musculoskeletal: strength 5/5 bilaterally, no c/c/e  Neurologic: A&O x 3, CN II-XII intact grossly  Psych: mood and affect congruent, no SI or HI    Results Review:   No visits with results within 2 Week(s) from this visit.   Latest known visit with results is:   Orders Only on 05/09/2023   Component Date Value Ref Range Status   • Urine Culture 05/09/2023 Final report   Final   • Result 1 05/09/2023 Comment   Final    Comment: Culture shows less than 10,000 colony forming units of bacteria per  milliliter of urine. This colony count is not generally considered  to be clinically significant.         CT Soft Tissue Neck With Contrast    Result Date: 5/15/2023  Narrative: CLINICAL INDICATION: Head/neck cancer, surveillance Additional history from EMR: Squamous cell carcinoma right lower lip TECHNIQUE: Helical images were obtained through the neck, and reconstructed in the axial plane on bone and soft tissue algorithm at multiple slice thicknesses. Coronal and sagittal reformatted images were created. 100 mL of Omnipaque 300 were administered intravenously. Total DLP (Dose-Length Product): 1706 mGy*cm. Please note: The reported value represents the total of one or more individual components during the CT acquisition on this date and at this time, and as such, the same value may appear in more than one CT report depending on the interpreting/reporting physicians. COMPARISON: Neck CT August 17, 2020 FINDINGS: Diagnostic Quality: Beam hardening from contrast obscures the left " supraclavicular region.. Soft Tissues: No definite masses are present within the soft tissues of the neck. No suspicious enhancing masses in the lower lip. There is asymmetric thinning of the cutaneous and subcutaneous plane in the region of right lower lip presumably representing postsurgical changes (series 5, image 329). Lymph Nodes: No definite significant cervical lymphadenopathy by size criteria. There is a nonenlarged 5 mm left level 4 lymph node which appears to be slightly smaller on comparison to the prior study in which it measured about 6.5 mm (series 5, image 258). Nonenlarged right level 4A lymph node measuring 6 mm does not show significant interval change (series 3, image 30). Adjacent smaller tiny right level 5 lymph nodes also appear to be similar to the prior (series 3, image 28). Pharynx/Larynx: Suboptimal evaluation of the glottis due to artifact in position of the vocal folds. No definite pharyngeal or laryngeal masses are present within the limitations. Oral Cavity: The patient is edentulous. Parapharyngeal Space: No lesions are present within the parapharyngeal space. Salivary Glands: Right submandibular gland is not clearly visualized which may represent atrophy/postsurgical change. There is also a little atrophy of the right parotid gland. No definite focal lesions in the submandibular and parotid glands. Thyroid: No focal thyroid lesions are present, within the limitations of the study. Orbits/Paranasal Sinuses/Skull Base: No orbital masses are present within the visualized portions of the orbits. Probable tiny retention cyst in the left maxillary sinus. Within the skull base, there is no focal lesion or destructive process. Bones/Spine: There are multilevel degenerative changes of the spine. Marked facet arthrosis with fusion at right C3 3-C4 level.  No bony destructive lesion is present. Thoracic Inlet and Lung Apices: Within the limitations of the study, no large masses are present at  the thoracic inlet. No definite suspicious lesions in the visualized lung. Please see the separate report for the chest CT scan for discussion of intrathoracic findings. Other Findings: None.    Impression: 1. No definite soft tissue masses in the neck including at the lower lip. 2. No new or significant cervical adenopathy. CRITICAL RESULT: No. COMMUNICATION: Per this written report. By electronically signing this report, I, the attending physician, attest that I have personally reviewed the images/data for the above examination(s) and agree with the final edited report. Drafted by Gordo Clemente DO on 5/15/2023 1:15 PM Final report signed by Shailesh Carreon MD on 5/15/2023 5:09 PM    CT Chest With Contrast Diagnostic    Result Date: 5/15/2023  Narrative: CLINICAL INDICATION: Malignant neoplasm of base of tongue TECHNIQUE: Multiple CT helical images were obtained from thoracic inlet through upper abdomen with administration of IV contrast. 100  mL of Omnipaque-300 were administered intravenously.   Total DLP (Dose-Length Product): 1705.82 mGy.cm. Please note: The reported value represents the total of one or more individual components during the CT acquisition on this date and at this time, and as such, the same value may appear in more than one CT report depending on the interpreting/reporting physicians. COMPARISON: June 1, 2022 FINDINGS: Mediastinum and Pleura: Stable borderline enlarged mediastinal lymph nodes. No pleural or pericardial effusion. Lungs: Numerous tiny calcified granulomata again noted. No suspicious pulmonary nodules. Endobronchial material is noted in multiple left lower lobe bronchi. There is a new nodular groundglass opacity in the left upper lobe centered around image 56. Other previously seen groundglass opacities in the right upper lobe, lingula, and left lower lobe have resolved. Upper Abdomen: No suspicious lesions in the partially visualized upper abdomen. Musculoskeletal: No  suspicious lytic or sclerotic lesion.    Impression: 1. No evidence of metastatic disease in the chest. 2. Minimal groundglass opacities and endobronchial material, likely secondary to trace aspiration. CRITICAL RESULT: No. COMMUNICATION: Per this written report. Drafted by Nilton Brenner MD on 5/15/2023 11:23 AM Final report signed by Nilton Brenner MD on 5/15/2023 11:28 AM    CT 3D Reconstruction Independent Workstation    Result Date: 5/16/2023  Narrative: CLINICAL INDICATION: Head/neck cancer, surveillance TECHNIQUE:   Spiral axial CT images of the head were obtained with intravenous contrast administration. 100 mL of Omnipaque 300 were administered. Total DLP (Dose-Length Product): Not reported. Please note: The reported value represents the total of one or more individual components during the CT acquisition on this date and at this time, and as such, the same value may appear in more than one CT report depending on the interpreting/reporting physicians. COMPARISON: Head CT April 10, 2023 MRI head November 19, 2021 FINDINGS: Diagnostic Quality: Adequate. The ventricles and sulci are normal in size. There is no definite acute large cortical infarct or large mass. Evaluation for intracranial hemorrhage is limited due to the presence of intravenous contrast, but no gross intracranial hemorrhage is identified. There are a mild amount of non-specific but likely ischemic white matter lesions. There is no abnormal enhancement.  Soft Tissues: No significant soft tissue swelling is present. Skull: There are no calvarial destructive lesions or fractures. Sinuses and Mastoids: The visualized portions of the paranasal sinuses are clear. The mastoid air cells are clear.    Impression: No evidence of intracranial metastasis. Please note that MRI is a more sensitive imaging modality for evaluation of intracranial metastases, and can be considered if necessary and if not contraindicated. CRITICAL RESULT:   No.  COMMUNICATION:   Per this written report. By electronically signing this report, I, the attending physician, attest that I have personally reviewed the images/data for the above examination(s) and agree with the final edited report. Drafted by Gordo Clemente DO on 5/15/2023 1:36 PM Final report signed by Shailesh Carreon MD on 5/15/2023 5:10 PM    CT Head w IV Contrast    Result Date: 5/15/2023  Narrative: CLINICAL INDICATION: Head/neck cancer, surveillance TECHNIQUE:   Spiral axial CT images of the head were obtained with intravenous contrast administration. 100 mL of Omnipaque 300 were administered. Total DLP (Dose-Length Product): Not reported. Please note: The reported value represents the total of one or more individual components during the CT acquisition on this date and at this time, and as such, the same value may appear in more than one CT report depending on the interpreting/reporting physicians. COMPARISON: Head CT April 10, 2023 MRI head November 19, 2021 FINDINGS: Diagnostic Quality: Adequate. The ventricles and sulci are normal in size. There is no definite acute large cortical infarct or large mass. Evaluation for intracranial hemorrhage is limited due to the presence of intravenous contrast, but no gross intracranial hemorrhage is identified. There are a mild amount of non-specific but likely ischemic white matter lesions. There is no abnormal enhancement.  Soft Tissues: No significant soft tissue swelling is present. Skull: There are no calvarial destructive lesions or fractures. Sinuses and Mastoids: The visualized portions of the paranasal sinuses are clear. The mastoid air cells are clear.    Impression: No evidence of intracranial metastasis. Please note that MRI is a more sensitive imaging modality for evaluation of intracranial metastases, and can be considered if necessary and if not contraindicated. CRITICAL RESULT:   No. COMMUNICATION:   Per this written report. By electronically  signing this report, I, the attending physician, attest that I have personally reviewed the images/data for the above examination(s) and agree with the final edited report. Drafted by Gordo Clemente DO on 5/15/2023 1:36 PM Final report signed by Shailesh Carreon MD on 5/15/2023 5:10 PM      Assessment / Plan      Assessment/Plan:   1. Head and neck cancer  -Initially diagnosed in 2018 and status post surgical resection in [place]  -Pathology consistent with a T2, N0 squamous cell carcinoma that was p16 positive with negative margins but LVI noted  -Deferred on radiation secondary to financial toxicity  -CT chest and neck in May 2023 reviewed without evidence of recurrent or metastatic disease  -Currently following with Dr. Valentino over at   -Advised patient that she does not need medical oncology at this time and can continue yearly follow-ups with ENT at     2. High grade squamous intraepithelial lesion on cytologic smear of cervix (HGSIL) (Primary)  -Per patient, she has had multiple surgical interventions related to this but has not been established with GYN/ONC since moving to [place]  -Have referred to Dr. Rico or Dr. Montes today  -     Ambulatory Referral to Gynecologic Oncology      Follow Up:   Follow-up as needed     Pato Win MD  Hematology and Oncology     Please note that portions of this note may have been completed with a voice recognition program. Efforts were made to edit the dictations, but occasionally words are mistranscribed.

## 2023-07-06 PROBLEM — R87.613 HIGH GRADE SQUAMOUS INTRAEPITHELIAL LESION ON CYTOLOGIC SMEAR OF CERVIX (HGSIL): Status: RESOLVED | Noted: 2023-05-26 | Resolved: 2023-07-06

## 2023-07-06 PROBLEM — Z87.410 HISTORY OF CERVICAL DYSPLASIA: Status: ACTIVE | Noted: 2023-07-06

## 2023-07-26 LAB — DRUGS UR: NORMAL

## 2023-08-09 ENCOUNTER — OFFICE VISIT (OUTPATIENT)
Dept: UROLOGY | Facility: CLINIC | Age: 63
End: 2023-08-09
Payer: MEDICARE

## 2023-08-09 VITALS
BODY MASS INDEX: 19.84 KG/M2 | DIASTOLIC BLOOD PRESSURE: 72 MMHG | SYSTOLIC BLOOD PRESSURE: 112 MMHG | OXYGEN SATURATION: 98 % | HEART RATE: 95 BPM | HEIGHT: 63 IN | WEIGHT: 112 LBS

## 2023-08-09 DIAGNOSIS — Z87.440 HISTORY OF RECURRENT UTI (URINARY TRACT INFECTION): Primary | ICD-10-CM

## 2023-08-09 LAB
BILIRUB BLD-MCNC: NEGATIVE MG/DL
CLARITY, POC: CLEAR
COLOR UR: YELLOW
EXPIRATION DATE: NORMAL
GLUCOSE UR STRIP-MCNC: NEGATIVE MG/DL
KETONES UR QL: NEGATIVE
LEUKOCYTE EST, POC: NEGATIVE
Lab: NORMAL
NITRITE UR-MCNC: NEGATIVE MG/ML
PH UR: 6 [PH] (ref 5–8)
PROT UR STRIP-MCNC: NEGATIVE MG/DL
RBC # UR STRIP: NEGATIVE /UL
SP GR UR: 1.01 (ref 1–1.03)
UROBILINOGEN UR QL: NORMAL

## 2023-08-09 NOTE — PROGRESS NOTES
UTI Office Visit      Patient Name: June Rodriguez  : 1960   MRN: [id number]     Chief Complaint:  UTI   Chief Complaint   Patient presents with    Recurrent UTI's        Referring Provider: No ref. provider found    History of Present Illness: June Rodriguez is a 63 y.o. Female who presents today for history of recurrent UTI. She has been taking Hiprex 1 G BID + vitamin C since May, 2023. She was unable to tolerate vaginal estrogen cream, she reported menopause symptoms/mood swings while using the cream. She denies any UTIs since last office visit in May. She felt some mild dysuria earlier this week that has resolved.     UA negative for infection or blood today.  Subjective      Review of System: Review of Systems   Genitourinary:  Negative for dysuria, frequency, hematuria and urgency.    I have reviewed the ROS documented by my clinical staff, I have updated appropriately and I agree. LISA Crowder    Past Medical History:  Past Medical History:   Diagnosis Date    Abnormal Pap smear of cervix     Bladder infection, chronic     Chronic pain 04/10/2023    Clotting disorder     Bleed to knee's every 15 min    DDD (degenerative disc disease), cervical     Frequent UTI     Hormone disorder 2007    After historectomy    Osteoarthritis     Scoliosis     Squamous cell carcinoma of the head and neck status postsurgical resection in 2018     STD (sexually transmitted disease)     Hpv,tricomis     Vaginal infection        Past Surgical History:  Past Surgical History:   Procedure Laterality Date    CYSTOSCOPY  ?    Over due    FACIAL PROCEDURE      HYSTERECTOMY      KNEE SURGERY Bilateral        Medications:    Current Outpatient Medications:     [facility] Vitamin C 1000 MG tablet, Take 1 tablet by mouth Every 12 (Twelve) Hours., Disp: , Rfl:     cyclobenzaprine (FLEXERIL) 10 MG tablet, Take 1 tablet by mouth 2 (Two) Times a Day As Needed for Muscle Spasms., Disp: , Rfl:     gabapentin  "(NEURONTIN) 800 MG tablet, Take 1 tablet by mouth 3 (Three) Times a Day., Disp: , Rfl:     methenamine (HIPREX) 1 g tablet, Take 1 tablet by mouth Every 12 (Twelve) Hours., Disp: , Rfl:     traMADol (ULTRAM) 50 MG tablet, Take 1 tablet by mouth 2 (Two) Times a Day., Disp: 60 tablet, Rfl: 2    varenicline (Chantix) 1 MG tablet, Take 1 tablet by mouth 2 (Two) Times a Day. (Patient not taking: Reported on 8/9/2023), Disp: 60 tablet, Rfl: 5    Allergies:  Allergies   Allergen Reactions    Sulfa Antibiotics Hives       Social History:  Social History     Socioeconomic History    Marital status: Single   Tobacco Use    Smoking status: Every Day     Packs/day: 0.50     Years: 45.00     Pack years: 22.50     Types: Cigarettes     Start date: 1/1/1978     Passive exposure: Current    Smokeless tobacco: Never   Vaping Use    Vaping Use: Some days    Substances: Nicotine    Devices: Disposable    Passive vaping exposure: Yes   Substance and Sexual Activity    Alcohol use: Not Currently    Drug use: Never    Sexual activity: Not Currently     Partners: Male     Birth control/protection: Abstinence, Hysterectomy     Comment: None       Family History:  Family History   Problem Relation Age of Onset    Coronary artery disease Mother     Heart disease Mother     Hypertension Mother     Alzheimer's disease Father          Objective     Physical Exam:   Vital Signs:   Vitals:    08/09/23 1431   BP: 112/72   Pulse: 95   SpO2: 98%   Weight: 50.8 kg (112 lb)   Height: 160 cm (63\")   PainSc: 0-No pain     Body mass index is 19.84 kg/mý.     Physical Exam  Vitals and nursing note reviewed.   Constitutional:       Appearance: Normal appearance.   HENT:      Head: Normocephalic and atraumatic.      Nose: Nose normal.      Mouth/Throat:      Mouth: Mucous membranes are moist.   Eyes:      Pupils: Pupils are equal, round, and reactive to light.   Pulmonary:      Effort: Pulmonary effort is normal.   Abdominal:      General: Abdomen is flat. "      Palpations: Abdomen is soft.   Musculoskeletal:         General: Normal range of motion.      Cervical back: Normal range of motion.   Skin:     General: Skin is warm and dry.      Capillary Refill: Capillary refill takes less than 2 seconds.   Neurological:      General: No focal deficit present.      Mental Status: She is alert.   Psychiatric:         Mood and Affect: Mood normal.     Labs:   Brief Urine Lab Results  (Last result in the past 365 days)        Color   Clarity   Blood   Leuk Est   Nitrite   Protein   CREAT   Urine HCG        08/09/23 1452 Yellow   Clear   Negative   Negative   Negative   Negative                   Urine Culture          4/19/2023    18:28 5/4/2023    12:13 5/9/2023    00:00   Urine Culture   Urine Culture >100,000 CFU/mL Enterobacter cloacae complex CRE  No growth  Final report         Lab Results   Component Value Date    GLUCOSE 114 (H) 04/11/2023    CALCIUM 8.8 04/11/2023     (L) 04/11/2023    K 3.6 04/11/2023    CO2 28.0 04/11/2023    CL 98 04/11/2023    BUN 6 (L) 04/11/2023    CREATININE 0.50 (L) 04/11/2023    EGFRIFAFRI 115 03/15/2023    BCR 12.0 04/11/2023    ANIONGAP 6.0 04/11/2023       Lab Results   Component Value Date    WBC 10.21 04/11/2023    HGB 11.5 (L) 04/11/2023    HCT 35.1 04/11/2023    MCV 93.4 04/11/2023     04/11/2023       Images:   CT Soft Tissue Neck With Contrast    Result Date: 5/15/2023  1. No definite soft tissue masses in the neck including at the lower lip. 2. No new or significant cervical adenopathy. CRITICAL RESULT: No. COMMUNICATION: Per this written report. By electronically signing this report, I, the attending physician, attest that I have personally reviewed the images/data for the above examination(s) and agree with the final edited report. Drafted by Gordo Clemente DO on 5/15/2023 1:15 PM Final report signed by Shailesh Carreon MD on 5/15/2023 5:09 PM    CT Chest With Contrast Diagnostic    Result Date: 5/15/2023  1. No  evidence of metastatic disease in the chest. 2. Minimal groundglass opacities and endobronchial material, likely secondary to trace aspiration. CRITICAL RESULT: No. COMMUNICATION: Per this written report. Drafted by Nilton Brenner MD on 5/15/2023 11:23 AM Final report signed by Nilton Brenner MD on 5/15/2023 11:28 AM    CT 3D Reconstruction Independent Workstation    Result Date: 5/16/2023  No evidence of intracranial metastasis. Please note that MRI is a more sensitive imaging modality for evaluation of intracranial metastases, and can be considered if necessary and if not contraindicated. CRITICAL RESULT:   No. COMMUNICATION:   Per this written report. By electronically signing this report, I, the attending physician, attest that I have personally reviewed the images/data for the above examination(s) and agree with the final edited report. Drafted by Gordo Clemente DO on 5/15/2023 1:36 PM Final report signed by Shailesh Carreon MD on 5/15/2023 5:10 PM    CT Head w IV Contrast    Result Date: 5/15/2023  No evidence of intracranial metastasis. Please note that MRI is a more sensitive imaging modality for evaluation of intracranial metastases, and can be considered if necessary and if not contraindicated. CRITICAL RESULT:   No. COMMUNICATION:   Per this written report. By electronically signing this report, I, the attending physician, attest that I have personally reviewed the images/data for the above examination(s) and agree with the final edited report. Drafted by Gordo Clemente DO on 5/15/2023 1:36 PM Final report signed by Shailesh Carreon MD on 5/15/2023 5:10 PM      Measures:   Tobacco:   June Rodriguez  reports that she has been smoking cigarettes. She started smoking about 45 years ago. She has a 22.50 pack-year smoking history. She has been exposed to tobacco smoke. She has never used smokeless tobacco..    I advised her to quit. She would like to quit. She is currently taking Chantix.      Urine Incontinence: Patient reports that she is not currently experiencing any symptoms of urinary incontinence.     Assessment / Plan      Assessment:  Jennifer is a 63 y.o. who presented today with history of recurrent uti.     Today we discussed patients symptoms and recurrent UTI prophylaxis. We discussed ok to use OTC coconut oil for vaginal dryness since she is unable to tolerate vaginal estrace cream. She is agreeable.     She will continue Hiprex 1 G BID + vitaminc C for UTI prophylaxis. Encouraged patient to stay hydrated. She will follow up in 6-8 months. We will plan to discontinue Hiprex at that time if she has not had any breakthrough UTIs. She is understanding and agreeable.      Diagnoses and all orders for this visit:    1. History of recurrent UTI (urinary tract infection) (Primary)  -     POC Urinalysis Dipstick, Automated      Follow Up:   Return for 6-8 months .    I spent approximately 25 minutes providing clinical care for this patient; including review of patient's chart and provider documentation, face to face time spent with patient in examination room (obtaining history, performing physical exam, discussing diagnosis and management options), placing orders, and completing patient documentation.     LISA Crowder  [facility]

## 2023-09-08 ENCOUNTER — TELEPHONE (OUTPATIENT)
Dept: FAMILY MEDICINE CLINIC | Facility: CLINIC | Age: 63
End: 2023-09-08
Payer: MEDICARE

## 2023-09-08 NOTE — TELEPHONE ENCOUNTER
PATIENT CALLED TO F/ U ON REF STATUS; LET PATIENT KNOW WE ARE STILL IN CLINIC; BUT THIS WILL BE ADDRESSED ONCE CLINIC IS DONE

## 2023-09-08 NOTE — TELEPHONE ENCOUNTER
Hub staff attempted to follow warm transfer process and was unsuccessful     Caller: June Rodriguez    Relationship to patient: Self    Best call back number: [phone]    Patient is needing: AN ALTERNATIVE MEDICATION TO     traMADol (ULTRAM) 50 MG tablet     THIS IS NOT EFFECTIVE     [facility] [id number] - [place], [place] - [address] [place] [address] - [phone]  - [phone] FX     REQUESTING TO BE CALLED WHEN ALTERNATIVE MEDICATION IS CALLED TO PHARMACY  CAN LEAVE A MESSAGE

## 2023-09-08 NOTE — TELEPHONE ENCOUNTER
Pt called back regarding her request for an alternative to Tramadol. She would like a call back if/when this is done please.

## 2023-09-09 ENCOUNTER — HOSPITAL ENCOUNTER (EMERGENCY)
Facility: HOSPITAL | Age: 63
Discharge: HOME | End: 2023-09-09
Attending: EMERGENCY MEDICINE
Payer: MEDICARE

## 2023-09-09 VITALS
OXYGEN SATURATION: 98 % | HEART RATE: 81 BPM | RESPIRATION RATE: 16 BRPM | HEIGHT: 63 IN | DIASTOLIC BLOOD PRESSURE: 69 MMHG | BODY MASS INDEX: 22.15 KG/M2 | WEIGHT: 125 LBS | SYSTOLIC BLOOD PRESSURE: 117 MMHG | TEMPERATURE: 97.4 F

## 2023-09-09 DIAGNOSIS — M54.12 CERVICAL RADICULOPATHY: ICD-10-CM

## 2023-09-09 DIAGNOSIS — G44.209 ACUTE NON INTRACTABLE TENSION-TYPE HEADACHE: Primary | ICD-10-CM

## 2023-09-09 PROCEDURE — 25010000002 METOCLOPRAMIDE PER 10 MG: Performed by: EMERGENCY MEDICINE

## 2023-09-09 PROCEDURE — 99283 EMERGENCY DEPT VISIT LOW MDM: CPT

## 2023-09-09 PROCEDURE — 96375 TX/PRO/DX INJ NEW DRUG ADDON: CPT

## 2023-09-09 PROCEDURE — 25010000002 KETOROLAC TROMETHAMINE PER 15 MG: Performed by: EMERGENCY MEDICINE

## 2023-09-09 PROCEDURE — 96374 THER/PROPH/DIAG INJ IV PUSH: CPT

## 2023-09-09 PROCEDURE — 25010000002 DIPHENHYDRAMINE PER 50 MG: Performed by: EMERGENCY MEDICINE

## 2023-09-09 RX ORDER — KETOROLAC TROMETHAMINE 15 MG/ML
15 INJECTION, SOLUTION INTRAMUSCULAR; INTRAVENOUS ONCE
Status: COMPLETED | OUTPATIENT
Start: 2023-09-09 | End: 2023-09-09

## 2023-09-09 RX ORDER — SODIUM CHLORIDE 0.9 % (FLUSH) 0.9 %
10 SYRINGE (ML) INJECTION AS NEEDED
Status: DISCONTINUED | OUTPATIENT
Start: 2023-09-09 | End: 2023-09-09 | Stop reason: HOSPADM

## 2023-09-09 RX ORDER — METOCLOPRAMIDE HYDROCHLORIDE 5 MG/ML
10 INJECTION INTRAMUSCULAR; INTRAVENOUS ONCE
Status: COMPLETED | OUTPATIENT
Start: 2023-09-09 | End: 2023-09-09

## 2023-09-09 RX ORDER — KETOROLAC TROMETHAMINE 10 MG/1
10 TABLET, FILM COATED ORAL EVERY 6 HOURS PRN
Qty: 15 TABLET | Refills: 0 | Status: SHIPPED | OUTPATIENT
Start: 2023-09-09 | End: 2023-09-14

## 2023-09-09 RX ORDER — DIPHENHYDRAMINE HYDROCHLORIDE 50 MG/ML
25 INJECTION INTRAMUSCULAR; INTRAVENOUS ONCE
Status: COMPLETED | OUTPATIENT
Start: 2023-09-09 | End: 2023-09-09

## 2023-09-09 RX ADMIN — DIPHENHYDRAMINE HYDROCHLORIDE 25 MG: 50 INJECTION INTRAMUSCULAR; INTRAVENOUS at 14:45

## 2023-09-09 RX ADMIN — METOCLOPRAMIDE 10 MG: 5 INJECTION, SOLUTION INTRAMUSCULAR; INTRAVENOUS at 14:45

## 2023-09-09 RX ADMIN — SODIUM CHLORIDE 1000 ML: 9 INJECTION, SOLUTION INTRAVENOUS at 14:41

## 2023-09-09 RX ADMIN — KETOROLAC TROMETHAMINE 15 MG: 15 INJECTION, SOLUTION INTRAMUSCULAR; INTRAVENOUS at 14:44

## 2023-09-09 NOTE — Clinical Note
[facility] EMERGENCY DEPARTMENT  [address]  [facility] [address]  Phone: [phone]    June Rodriguez was seen and treated in our emergency department on 9/9/2023.  She may return to work on 09/12/2023.         Thank you for choosing [facility].    Anthony Delgadillo MD

## 2023-09-09 NOTE — ED PROVIDER NOTES
Subjective   History of Present Illness  63-year-old female with a chronic history of neck pain with known spondylosis, neuroforaminal narrowing, and degenerative disc disease who presents with a complaint of neck pain.  The patient reports that she moved to to [place] in March of this year.  She was evaluated by a physician in April and ultimately had CT scans performed which identified her to have multiple levels of degenerative disc disease throughout the cervical spine.  Most of which were classified there was spondylosis at multiple levels classified primarily as mild to moderate.  There was one single level of right foraminal stenosis which was classified as severe.  However the patient does not have any radicular symptoms or weakness to any of the extremities.  She reports that she has been seen by 2 pain management doctors since having this imaging performed.  She states that she received regular pain medication when she lived in [place].  Both of the pain management doctors here in [place] desired to place a pain pump, but were only able to do so after surgery was obtained per the patient's report.  She is scheduled to have evaluation potential surgical intervention by a neurosurgeon over the [facility] on the third of next month.  She has been taking over-the-counter pain medication for pain control at this given time.  She denies fever or infectious symptoms.  No upper respiratory congestion, or sore throat.  She does report several consecutive months of mild rhinorrhea that sounds consistent with seasonal allergies.  This is not acute.  No abdominal pain or change in bowel or urinary function.  No other acute complaints.    Review of Systems   Constitutional:  Negative for chills, fatigue and fever.   HENT:  Positive for rhinorrhea. Negative for congestion, ear pain, postnasal drip, sinus pressure and sore throat.    Eyes:  Negative for pain, redness and visual disturbance.   Respiratory:   Negative for cough, chest tightness and shortness of breath.    Cardiovascular:  Negative for chest pain, palpitations and leg swelling.   Gastrointestinal:  Negative for abdominal pain, anal bleeding, blood in stool, diarrhea, nausea and vomiting.   Endocrine: Negative for polydipsia and polyuria.   Genitourinary:  Negative for difficulty urinating, dysuria, frequency and urgency.   Musculoskeletal:  Positive for neck pain. Negative for arthralgias and back pain.   Skin:  Negative for pallor and rash.   Allergic/Immunologic: Negative for environmental allergies and immunocompromised state.   Neurological:  Negative for dizziness, weakness and headaches.   Hematological:  Negative for adenopathy.   Psychiatric/Behavioral:  Negative for confusion, self-injury and suicidal ideas. The patient is not nervous/anxious.    All other systems reviewed and are negative.    Past Medical History:   Diagnosis Date    Abnormal Pap smear of cervix 2002    Bladder infection, chronic     Chronic pain 04/10/2023    Clotting disorder 2007    Bleed to knee’s every 15 min    DDD (degenerative disc disease), cervical     Frequent UTI     Hormone disorder 2007    After historectomy    Osteoarthritis     Scoliosis     Squamous cell carcinoma of the head and neck status postsurgical resection in 2018     STD (sexually transmitted disease) 1986    Hpv,tricomis 2002    Vaginal infection 2007       Allergies   Allergen Reactions    Sulfa Antibiotics Hives       Past Surgical History:   Procedure Laterality Date    CYSTOSCOPY  ?    Over due    FACIAL PROCEDURE      HYSTERECTOMY      KNEE SURGERY Bilateral        Family History   Problem Relation Age of Onset    Coronary artery disease Mother     Heart disease Mother     Hypertension Mother     Alzheimer's disease Father        Social History     Socioeconomic History    Marital status: Single   Tobacco Use    Smoking status: Every Day     Packs/day: 0.50     Years: 45.00     Pack years: 22.50      Types: Cigarettes     Start date: 1/1/1978     Passive exposure: Current    Smokeless tobacco: Never   Vaping Use    Vaping Use: Some days    Substances: Nicotine    Devices: Disposable    Passive vaping exposure: Yes   Substance and Sexual Activity    Alcohol use: Not Currently    Drug use: Never    Sexual activity: Not Currently     Partners: Male     Birth control/protection: Abstinence, Hysterectomy     Comment: None           Objective   Physical Exam  Vitals and nursing note reviewed.   Constitutional:       General: She is not in acute distress.     Appearance: Normal appearance. She is well-developed. She is not toxic-appearing or diaphoretic.   HENT:      Head: Normocephalic and atraumatic.      Right Ear: External ear normal.      Left Ear: External ear normal.      Nose: Nose normal.   Eyes:      General: Lids are normal.      Pupils: Pupils are equal, round, and reactive to light.   Neck:      Trachea: No tracheal deviation.     Cardiovascular:      Rate and Rhythm: Regular rhythm. Tachycardia present.      Pulses: No decreased pulses.      Heart sounds: Normal heart sounds. No murmur heard.    No friction rub. No gallop.   Pulmonary:      Effort: Pulmonary effort is normal. No respiratory distress.      Breath sounds: Normal breath sounds. No decreased breath sounds, wheezing, rhonchi or rales.   Abdominal:      General: Bowel sounds are normal.      Palpations: Abdomen is soft.      Tenderness: There is no abdominal tenderness. There is no guarding or rebound.   Musculoskeletal:         General: No deformity. Normal range of motion.      Cervical back: Normal range of motion and neck supple.   Lymphadenopathy:      Cervical: No cervical adenopathy.   Skin:     General: Skin is warm and dry.      Findings: No rash.   Neurological:      Mental Status: She is alert and oriented to person, place, and time.      Cranial Nerves: No cranial nerve deficit.      Sensory: No sensory deficit.   Psychiatric:          Speech: Speech normal.         Behavior: Behavior normal.         Thought Content: Thought content normal.         Judgment: Judgment normal.       Procedures           ED Course                                           Medical Decision Making  Ventral diagnosis includes cervical radiculopathy, tension headache, cervical spine fracture, degenerative disc disease, upper respiratory infectious process.    The patient reports a headache being referred from the neck into the head.  She has normal intact mentation with a normal neurologic exam.    She is felt to have a tension headache therefore was given a headache cocktail.    MR imaging of the cervical and thoracic spine was reviewed.  The patient has already been evaluated by neurosurgery and has scheduled outpatient management within the next month.    She will be discharged with advised to apply heat, perform regular stretching, and take Tylenol or ibuprofen as needed for pain.    Problems Addressed:  Acute non intractable tension-type headache: complicated acute illness or injury  Cervical radiculopathy: complicated acute illness or injury    Amount and/or Complexity of Data Reviewed  External Data Reviewed: radiology.     Details: MRIs performed on outpatient basis roughly 3 months ago were reviewed.    Risk  Prescription drug management.        Final diagnoses:   Acute non intractable tension-type headache   Cervical radiculopathy       ED Disposition  ED Disposition       ED Disposition   Discharge    Condition   Stable    Comment   --               Brendan Avitia DO  [address] [id number] [phone]    In 1 week           Medication List        New Prescriptions      ketorolac 10 MG tablet  Commonly known as: TORADOL  Take 1 tablet by mouth Every 6 (Six) Hours As Needed for Moderate Pain for up to 5 days.               Where to Get Your Medications        These medications were sent to [facility] [id number]  [place] - [address] [facility] [address] - [phone] PH - [phone] FX  [address] [address]      Phone: [phone]   ketorolac 10 MG tablet            Anthony Delgadillo MD  09/09/23 7947

## 2023-09-09 NOTE — DISCHARGE INSTRUCTIONS
Apply heat to the muscles of the neck and perform regular stretching.    Take Toradol as needed for pain not controlled by Tylenol.    Keep outpatient follow-up with neurosurgery as scheduled.

## 2023-09-13 DIAGNOSIS — G89.29 CHRONIC BACK PAIN, UNSPECIFIED BACK LOCATION, UNSPECIFIED BACK PAIN LATERALITY: ICD-10-CM

## 2023-09-13 DIAGNOSIS — M41.9 SCOLIOSIS, UNSPECIFIED SCOLIOSIS TYPE, UNSPECIFIED SPINAL REGION: Primary | ICD-10-CM

## 2023-09-13 DIAGNOSIS — M54.9 CHRONIC BACK PAIN, UNSPECIFIED BACK LOCATION, UNSPECIFIED BACK PAIN LATERALITY: ICD-10-CM

## 2023-09-13 RX ORDER — METHYLPREDNISOLONE 4 MG/1
TABLET ORAL
Qty: 21 TABLET | Refills: 0 | Status: SHIPPED | OUTPATIENT
Start: 2023-09-13

## 2023-09-25 ENCOUNTER — TELEPHONE (OUTPATIENT)
Dept: UROLOGY | Facility: CLINIC | Age: 63
End: 2023-09-25

## 2023-09-25 NOTE — TELEPHONE ENCOUNTER
I called the patient and let her know Ana Abram would like her to go to her PCP for urine sample and they can culture/send antibiotics as they feel appropriate. She stated she would give them a call.

## 2023-09-25 NOTE — TELEPHONE ENCOUNTER
Patient called stating she is having UTI symptoms, bad urine odor. She stated she didn't know if you were the one she needed to contact for this and if you could send her in a abx. I let the patient know that she might need to go to her PCP but If glenn wants her to drop off a urine that she would have to do that before she could send in a abx. I let her know I'd send glenn ball a message and let her know what she said. She stated understanding

## 2023-09-26 ENCOUNTER — LAB (OUTPATIENT)
Dept: LAB | Facility: HOSPITAL | Age: 63
End: 2023-09-26
Payer: MEDICARE

## 2023-09-26 ENCOUNTER — TELEPHONE (OUTPATIENT)
Dept: FAMILY MEDICINE CLINIC | Facility: CLINIC | Age: 63
End: 2023-09-26
Payer: MEDICARE

## 2023-09-26 DIAGNOSIS — E55.9 VITAMIN D DEFICIENCY: ICD-10-CM

## 2023-09-26 DIAGNOSIS — R30.0 DYSURIA: Primary | ICD-10-CM

## 2023-09-26 DIAGNOSIS — Z00.00 PREVENTATIVE HEALTH CARE: ICD-10-CM

## 2023-09-26 LAB
BACTERIA UR QL AUTO: ABNORMAL /HPF
BILIRUB UR QL STRIP: NEGATIVE
CLARITY UR: CLEAR
COLOR UR: ABNORMAL
GLUCOSE UR STRIP-MCNC: NEGATIVE MG/DL
HGB UR QL STRIP.AUTO: NEGATIVE
HYALINE CASTS UR QL AUTO: ABNORMAL /LPF
KETONES UR QL STRIP: NEGATIVE
LEUKOCYTE ESTERASE UR QL STRIP.AUTO: ABNORMAL
NITRITE UR QL STRIP: NEGATIVE
PH UR STRIP.AUTO: 6 [PH] (ref 5–8)
PROT UR QL STRIP: NEGATIVE
RBC # UR STRIP: ABNORMAL /HPF
REF LAB TEST METHOD: ABNORMAL
SP GR UR STRIP: 1.01 (ref 1–1.03)
SQUAMOUS #/AREA URNS HPF: ABNORMAL /HPF
UROBILINOGEN UR QL STRIP: ABNORMAL
WBC # UR STRIP: ABNORMAL /HPF

## 2023-09-26 PROCEDURE — 84439 ASSAY OF FREE THYROXINE: CPT

## 2023-09-26 PROCEDURE — 80061 LIPID PANEL: CPT

## 2023-09-26 PROCEDURE — 85025 COMPLETE CBC W/AUTO DIFF WBC: CPT

## 2023-09-26 PROCEDURE — 84443 ASSAY THYROID STIM HORMONE: CPT

## 2023-09-26 PROCEDURE — 82306 VITAMIN D 25 HYDROXY: CPT

## 2023-09-26 PROCEDURE — 80053 COMPREHEN METABOLIC PANEL: CPT

## 2023-09-26 PROCEDURE — 81001 URINALYSIS AUTO W/SCOPE: CPT

## 2023-09-26 PROCEDURE — 83036 HEMOGLOBIN GLYCOSYLATED A1C: CPT

## 2023-09-26 PROCEDURE — 36415 COLL VENOUS BLD VENIPUNCTURE: CPT

## 2023-09-26 NOTE — TELEPHONE ENCOUNTER
Caller: June Rodriguez    Relationship: Self    Best call back number: [phone]    What orders are you requesting (i.e. lab or imaging): URINALYSIS    In what timeframe would the patient need to come in: ASAP    Where will you receive your lab/imaging services: AT OFFICE    Additional notes: URINE ODOR/  BURNING

## 2023-09-26 NOTE — TELEPHONE ENCOUNTER
Attempted to reach patient back about her urine concerns. No answer. LVM with office # given.    Pt needs an appt for evaluation of UTI.    Hub may relay message and schedule.

## 2023-09-27 LAB
BASOPHILS # BLD AUTO: 0.07 10*3/MM3 (ref 0–0.2)
BASOPHILS NFR BLD AUTO: 0.8 % (ref 0–1.5)
DEPRECATED RDW RBC AUTO: 41.4 FL (ref 37–54)
EOSINOPHIL # BLD AUTO: 0.16 10*3/MM3 (ref 0–0.4)
EOSINOPHIL NFR BLD AUTO: 1.8 % (ref 0.3–6.2)
ERYTHROCYTE [DISTWIDTH] IN BLOOD BY AUTOMATED COUNT: 12.6 % (ref 12.3–15.4)
HBA1C MFR BLD: 5.1 % (ref 4.8–5.6)
HCT VFR BLD AUTO: 41.8 % (ref 34–46.6)
HGB BLD-MCNC: 14.4 G/DL (ref 12–15.9)
HOLD SPECIMEN: NORMAL
IMM GRANULOCYTES # BLD AUTO: 0.02 10*3/MM3 (ref 0–0.05)
IMM GRANULOCYTES NFR BLD AUTO: 0.2 % (ref 0–0.5)
LYMPHOCYTES # BLD AUTO: 2.1 10*3/MM3 (ref 0.7–3.1)
LYMPHOCYTES NFR BLD AUTO: 23.5 % (ref 19.6–45.3)
MCH RBC QN AUTO: 31.1 PG (ref 26.6–33)
MCHC RBC AUTO-ENTMCNC: 34.4 G/DL (ref 31.5–35.7)
MCV RBC AUTO: 90.3 FL (ref 79–97)
MONOCYTES # BLD AUTO: 0.9 10*3/MM3 (ref 0.1–0.9)
MONOCYTES NFR BLD AUTO: 10.1 % (ref 5–12)
NEUTROPHILS NFR BLD AUTO: 5.7 10*3/MM3 (ref 1.7–7)
NEUTROPHILS NFR BLD AUTO: 63.6 % (ref 42.7–76)
NRBC BLD AUTO-RTO: 0 /100 WBC (ref 0–0.2)
PLATELET # BLD AUTO: 303 10*3/MM3 (ref 140–450)
PMV BLD AUTO: 8.8 FL (ref 6–12)
RBC # BLD AUTO: 4.63 10*6/MM3 (ref 3.77–5.28)
WBC NRBC COR # BLD: 8.95 10*3/MM3 (ref 3.4–10.8)

## 2023-09-27 NOTE — TELEPHONE ENCOUNTER
HUB RELAYED MESSAGE TO PATIENT.      PATIENT CAME IN YESTERDAY 9/26/23 AND HAD A URINALYSIS AND LAB WORK DONE.    DOES THE PATIENT STILL NEED TO COME IN FOR AN APPOINTMENT FOR EVALUATION?    IF APPOINTMENT IS NOT NECESSARY, PATIENT IS REQUESTING ANY PRESCRIPTIONS GO TO:     [facility] [id number] - [place], [place] - [address] [place] [address] - [phone]  - [phone] FX     PLEASE ADVISE AS SOON AS POSSIBLE.

## 2023-09-27 NOTE — TELEPHONE ENCOUNTER
Spoke with patient regarding recommendations from [facility]. She is frustrated that she had requested these labs prior and that a urine culture was not included. I explained that from her urinalysis it only shown trace amounts of leuks however it was mostly normal that a culture was not indicated at that time. She is going to contact her urologist whom is within [facility] to see what their recommendations would be for her.

## 2023-09-27 NOTE — TELEPHONE ENCOUNTER
Patient states she went to her PCP, they did a UA but no culture.  Patient did not see the PCP, just went to the lab.  Patient states she has frequency and dysuria.  Informed pt she can go to [facility] or any other place that takes her [employer] insurance.

## 2023-09-27 NOTE — TELEPHONE ENCOUNTER
Patient was advised of the appointment needed, however she was told [facility] no longer accepts her insurance now she had [employer]. She has a neck surgery scheduled on 10/3/2023 with a different office and needs this cleared up by then. She is unsure she can wait for a urine culture or what to do at this point.

## 2023-09-27 NOTE — TELEPHONE ENCOUNTER
2nd attempt      Attempted to reach patient back about her urine concerns. No answer. LVM with office # given.     Pt needs an appt for evaluation of UTI.     Hub may relay message and schedule.

## 2023-09-27 NOTE — TELEPHONE ENCOUNTER
Attempted to contact patient back, its recommended she see Urgent Care walk-in for further evaluation.  Hub may relay message and document.

## 2023-09-27 NOTE — TELEPHONE ENCOUNTER
Needs appointment. I ordered a urine culture. Please see if this can be added on to her urine sample from yesterday.

## 2023-09-28 LAB
25(OH)D3+25(OH)D2 SERPL-MCNC: 18.5 NG/ML (ref 30–100)
ALBUMIN SERPL-MCNC: 4.7 G/DL (ref 3.9–4.9)
ALBUMIN/GLOB SERPL: 1.7 {RATIO} (ref 1.2–2.2)
ALP SERPL-CCNC: 143 IU/L (ref 44–121)
ALT SERPL-CCNC: 9 IU/L (ref 0–32)
AST SERPL-CCNC: 16 IU/L (ref 0–40)
BILIRUB SERPL-MCNC: <0.2 MG/DL (ref 0–1.2)
BUN SERPL-MCNC: 9 MG/DL (ref 8–27)
BUN/CREAT SERPL: 15 (ref 12–28)
CALCIUM SERPL-MCNC: 9.7 MG/DL (ref 8.7–10.3)
CHLORIDE SERPL-SCNC: 97 MMOL/L (ref 96–106)
CHOLEST SERPL-MCNC: 223 MG/DL (ref 100–199)
CO2 SERPL-SCNC: 25 MMOL/L (ref 20–29)
CREAT SERPL-MCNC: 0.59 MG/DL (ref 0.57–1)
EGFRCR SERPLBLD CKD-EPI 2021: 102 ML/MIN/1.73
GLOBULIN SER CALC-MCNC: 2.7 G/DL (ref 1.5–4.5)
GLUCOSE SERPL-MCNC: 92 MG/DL (ref 70–99)
HDLC SERPL-MCNC: 54 MG/DL
LDLC SERPL CALC-MCNC: 147 MG/DL (ref 0–99)
POTASSIUM SERPL-SCNC: 3.8 MMOL/L (ref 3.5–5.2)
PROT SERPL-MCNC: 7.4 G/DL (ref 6–8.5)
SODIUM SERPL-SCNC: 139 MMOL/L (ref 134–144)
T4 FREE SERPL-MCNC: 1.39 NG/DL (ref 0.82–1.77)
TRIGL SERPL-MCNC: 125 MG/DL (ref 0–149)
TSH SERPL DL<=0.005 MIU/L-ACNC: 1.57 UIU/ML (ref 0.45–4.5)
VLDLC SERPL CALC-MCNC: 22 MG/DL (ref 5–40)

## 2023-10-02 RX ORDER — SOD SULF/POT CHLORIDE/MAG SULF 1.479 G
12 TABLET ORAL ONCE
Qty: 12 TABLET | Refills: 0 | Status: SHIPPED | OUTPATIENT
Start: 2023-10-02 | End: 2023-10-02

## 2023-10-04 ENCOUNTER — READMISSION MANAGEMENT (OUTPATIENT)
Dept: CALL CENTER | Facility: HOSPITAL | Age: 63
End: 2023-10-04
Payer: MEDICARE

## 2023-10-04 NOTE — OUTREACH NOTE
Prep Survey      Flowsheet Row Responses   [facility] facility patient discharged from? Non-BH   Is LACE score < 7 ? Non-BH Discharge   Eligibility [facility]   Date of Admission 10/03/23   Date of Discharge 10/04/23   Discharge diagnosis C5-7 anterior cervical discectomy and fusion   Does the patient have one of the following disease processes/diagnoses(primary or secondary)? General Surgery   Prep survey completed? Yes            Marina PEARL - Registered Nurse

## 2023-10-05 ENCOUNTER — TRANSITIONAL CARE MANAGEMENT TELEPHONE ENCOUNTER (OUTPATIENT)
Dept: CALL CENTER | Facility: HOSPITAL | Age: 63
End: 2023-10-05
Payer: MEDICARE

## 2023-10-05 NOTE — OUTREACH NOTE
Call Center TCM Note      Flowsheet Row Responses   [facility] patient discharged from? Non-BH   Does the patient have one of the following disease processes/diagnoses(primary or secondary)? General Surgery   TCM attempt successful? No   Unsuccessful attempts Attempt 1  [Steven on verbal release attempted-no answer]            Birgit Flores RN    10/5/2023, 13:36 EDT

## 2023-10-05 NOTE — OUTREACH NOTE
Call Center TCM Note      Flowsheet Row Responses   [facility] patient discharged from? Non-BH   Does the patient have one of the following disease processes/diagnoses(primary or secondary)? General Surgery   TCM attempt successful? No   Unsuccessful attempts Attempt 2            Birgit Flores RN    10/5/2023, 14:19 EDT

## 2023-10-06 ENCOUNTER — TRANSITIONAL CARE MANAGEMENT TELEPHONE ENCOUNTER (OUTPATIENT)
Dept: CALL CENTER | Facility: HOSPITAL | Age: 63
End: 2023-10-06
Payer: MEDICARE

## 2023-10-06 NOTE — OUTREACH NOTE
Call Center TCM Note      Flowsheet Row Responses   [facility] facility patient discharged from? Non-  []   Does the patient have one of the following disease processes/diagnoses(primary or secondary)? General Surgery   TCM attempt successful? No   Unsuccessful attempts Attempt 3            Sunshine Rodrigues RN    10/6/2023, 11:03 EDT

## 2023-11-20 DIAGNOSIS — Z87.440 HISTORY OF RECURRENT UTI (URINARY TRACT INFECTION): Primary | ICD-10-CM

## 2023-11-20 RX ORDER — ASCORBIC ACID 1000 MG
1000 TABLET ORAL DAILY
Qty: 90 TABLET | Refills: 1 | Status: SHIPPED | OUTPATIENT
Start: 2023-11-20

## 2023-11-20 RX ORDER — METHENAMINE HIPPURATE 1000 MG/1
1 TABLET ORAL EVERY 12 HOURS SCHEDULED
Qty: 90 TABLET | Refills: 3 | Status: SHIPPED | OUTPATIENT
Start: 2023-11-20

## 2023-12-13 ENCOUNTER — TELEPHONE (OUTPATIENT)
Dept: UROLOGY | Facility: CLINIC | Age: 63
End: 2023-12-13
Payer: MEDICARE

## 2023-12-13 NOTE — TELEPHONE ENCOUNTER
Hub staff attempted to follow warm transfer process and was unsuccessful     Caller: CHINO CEDILLO    Relationship to patient: SELF    Best call back number: [phone]    Patient is needing: PT STATES HAVE VERY BAD UTI AND THE RED PILL YOU TAKE IS NOT HELPING. PAT IS WANTING EITHER A SCRIPT FOR THE UTI OR NEEDS TO KNOW IF NEEDS TO BE SEEN. PATIENT STATES THIS IS VERY URGENT AND DOES NOT WANT TO BE ADMITTED BACK TO THE HOSPITAL. PLEASE ASAP TO FIGURE OUT WHAT TO DO NEXT. THANK YOU.

## 2023-12-14 NOTE — TELEPHONE ENCOUNTER
I called pt to let her know that she needs to start the abx that [facility] gave her for her infection and that she can take azo if needed for the burning until it comes back. Patient was asking about pain clinic referral and I let her know that would be something her PCP would need to put in and she is going to give [facility] a call to see if they can do that for her.

## 2024-04-05 DIAGNOSIS — Z87.440 HISTORY OF RECURRENT UTI (URINARY TRACT INFECTION): ICD-10-CM

## 2024-04-05 NOTE — TELEPHONE ENCOUNTER
Rx Refill Note  Requested Prescriptions     Pending Prescriptions Disp Refills    ascorbic acid (VITAMIN C) 1000 MG tablet [Pharmacy Med Name: VITAMIN C 1,000 MG TABLET] 90 tablet 1     Sig: TAKE 1 TABLET BY MOUTH DAILY      Last office visit with prescribing clinician: 8/9/2023   Last telemedicine visit with prescribing clinician: Visit date not found   Next office visit with prescribing clinician: Visit date not found       Matilda Knight MA  04/05/24, 16:19 EDT

## 2024-04-06 RX ORDER — MULTIVIT WITH MINERALS/LUTEIN
1000 TABLET ORAL DAILY
Qty: 90 TABLET | Refills: 1 | Status: SHIPPED | OUTPATIENT
Start: 2024-04-06

## 2025-07-28 NOTE — OUTREACH NOTE
Prep Survey    Flowsheet Row Responses   [facility] patient discharged from? [place]   Is LACE score < 7 ? Yes   Eligibility [facility]   Date of Admission 04/10/23   Date of Discharge 04/11/23   Discharge Disposition Home or Self Care   Discharge diagnosis Sepsis secondary to UTI   Does the patient have one of the following disease processes/diagnoses(primary or secondary)? Sepsis   Does the patient have Home health ordered? No   Is there a DME ordered? No   Comments regarding appointments new PCP appt   Medication alerts for this patient see AVS for meds--po ceftin   Prep survey completed? Yes          Karla CARVAJAL - Registered Nurse         [Condoms] : uses condoms [Y] : Patient is sexually active [N] : Patient denies prior pregnancies [PGHxTotal] : 0 [PGHxFullTerm] : 0 [PGHxPremature] : 0 [PGHxAbortions] : 0 [[facility] : [facility] [PGHxABInduced] : 0 [PGHxABSpont] : 0 [PGHxEctopic] : 0 [PGHxMultBirths] : 0 [Regular Cycle Intervals] : periods have been regular [Frequency: Q ___ days] : menstrual periods occur approximately every [unfilled] days [Menarche Age: ____] : age at menarche was [unfilled]